# Patient Record
Sex: MALE | Race: WHITE | NOT HISPANIC OR LATINO | Employment: OTHER | URBAN - METROPOLITAN AREA
[De-identification: names, ages, dates, MRNs, and addresses within clinical notes are randomized per-mention and may not be internally consistent; named-entity substitution may affect disease eponyms.]

---

## 2017-01-22 ENCOUNTER — PATIENT OUTREACH (OUTPATIENT)
Dept: HEALTH INFORMATION MANAGEMENT | Facility: OTHER | Age: 72
End: 2017-01-22

## 2017-01-22 NOTE — PROGRESS NOTES
01/22/17 1ST ATTEMPT AWV-LEFT     Health Maintenance Due   Topic Date Due   • IMM DTaP/Tdap/Td Vaccine (1 - Tdap) 02/28/1964   • RETINAL SCREENING  03/10/2016

## 2017-02-08 NOTE — PROGRESS NOTES
2/8/17 -  Attempt #:2, PT SCHEDULED AWV SCP AND TDAP VACCINE.  COMPLETED NEW MEMBER.     Care Gap Scheduling (Attempt to Schedule EACH Overdue Care Gap!)     Health Maintenance Due   Topic Date Due   • IMM DTaP/Tdap/Td Vaccine (1 - Tdap) Scheduled   • RETINAL SCREENING  Will schedule with his own Ophthalmologist.           [employer] Activation:Active

## 2017-02-09 ENCOUNTER — TELEPHONE (OUTPATIENT)
Dept: MEDICAL GROUP | Age: 72
End: 2017-02-09

## 2017-02-09 NOTE — TELEPHONE ENCOUNTER
ANNUAL WELLNESS VISIT PRE-VISIT PLANNING     1.  Reviewed last PCP office visit assessment and plan notes: Yes    2.  If any orders were placed last visit do we have Results/Consult Notes?        •  Labs? Yes order for 5/22/17       •  Imaging? No        •  Referrals? No     3.  Patient Care Coordination Note was updated with diagnosis information:  Yes    4.  Patient is due for these Health Maintenance Topics:   Health Maintenance Due   Topic Date Due   • IMM DTaP/Tdap/Td Vaccine (1 - Tdap) 02/28/1964   • RETINAL SCREENING  03/10/2016   • Annual Wellness Visit  02/09/2017              5.  Immunizations were updated in [employer] using WebIZ?: Yes       •  Web Iz Recommendations:  Tdap, Hep A, Hep B,        •  Is patient due for Tdap/Shingles? Yes.  If yes, was patient alerted of copay? Yes    6.  Patient has:       •   Diabetes: Type 2 DM       •   COPD: no       •   CHF: no       •   Depression: no    7.  Updated Care Team with [employer] and all specialists?        •   Gait devices, O2, CPAP, etc: yes        •   Eye professional: yes       •   Other specialists (GYN, cardiology, endo, etc): yes    8.  Is patient in need of any refills prior to office visit? No       •    Separate refill encounter created?: no    9.  Patient was informed to arrive 15 min prior to their scheduled appointment and bring in their medication bottles? yes    10.  Patient was advised: “This is a free wellness visit. The provider will screen for medical conditions to help you stay healthy. If you have other concerns to address you may be asked to discuss these at a separate visit or there may be an additional fee.”  Yes

## 2017-02-14 ENCOUNTER — OFFICE VISIT (OUTPATIENT)
Dept: MEDICAL GROUP | Age: 72
End: 2017-02-14
Payer: MEDICARE

## 2017-02-14 VITALS
TEMPERATURE: 97.7 F | OXYGEN SATURATION: 96 % | BODY MASS INDEX: 29.26 KG/M2 | HEIGHT: 71 IN | WEIGHT: 209 LBS | SYSTOLIC BLOOD PRESSURE: 108 MMHG | DIASTOLIC BLOOD PRESSURE: 78 MMHG | HEART RATE: 92 BPM

## 2017-02-14 DIAGNOSIS — N18.30 CKD (CHRONIC KIDNEY DISEASE) STAGE 3, GFR 30-59 ML/MIN (HCC): ICD-10-CM

## 2017-02-14 DIAGNOSIS — H35.049 RETINAL HEMORRHAGE DUE TO SECONDARY DIABETES (HCC): ICD-10-CM

## 2017-02-14 DIAGNOSIS — E11.8 CONTROLLED TYPE 2 DIABETES MELLITUS WITH COMPLICATION, WITHOUT LONG-TERM CURRENT USE OF INSULIN (HCC): ICD-10-CM

## 2017-02-14 DIAGNOSIS — R60.0 BILATERAL EDEMA OF LOWER EXTREMITY: ICD-10-CM

## 2017-02-14 DIAGNOSIS — N52.8 OTHER MALE ERECTILE DYSFUNCTION: ICD-10-CM

## 2017-02-14 DIAGNOSIS — Z00.00 MEDICARE ANNUAL WELLNESS VISIT, SUBSEQUENT: ICD-10-CM

## 2017-02-14 DIAGNOSIS — K21.00 GASTROESOPHAGEAL REFLUX DISEASE WITH ESOPHAGITIS: ICD-10-CM

## 2017-02-14 DIAGNOSIS — I10 ESSENTIAL HYPERTENSION: ICD-10-CM

## 2017-02-14 DIAGNOSIS — E78.2 MIXED HYPERLIPIDEMIA: ICD-10-CM

## 2017-02-14 DIAGNOSIS — E11.36 CATARACT, DIABETIC (HCC): ICD-10-CM

## 2017-02-14 DIAGNOSIS — E13.319 RETINAL HEMORRHAGE DUE TO SECONDARY DIABETES (HCC): ICD-10-CM

## 2017-02-14 DIAGNOSIS — M1A.09X0 IDIOPATHIC CHRONIC GOUT OF MULTIPLE SITES WITHOUT TOPHUS: ICD-10-CM

## 2017-02-14 PROCEDURE — G0439 PPPS, SUBSEQ VISIT: HCPCS | Performed by: INTERNAL MEDICINE

## 2017-02-14 PROCEDURE — 1036F TOBACCO NON-USER: CPT | Performed by: INTERNAL MEDICINE

## 2017-02-14 PROCEDURE — G8510 SCR DEP NEG, NO PLAN REQD: HCPCS | Performed by: INTERNAL MEDICINE

## 2017-02-14 PROCEDURE — 3044F HG A1C LEVEL LT 7.0%: CPT | Performed by: INTERNAL MEDICINE

## 2017-02-14 ASSESSMENT — PAIN SCALES - GENERAL: PAINLEVEL: NO PAIN

## 2017-02-14 ASSESSMENT — PATIENT HEALTH QUESTIONNAIRE - PHQ9: CLINICAL INTERPRETATION OF PHQ2 SCORE: 0

## 2017-02-14 NOTE — MR AVS SNAPSHOT
"        Corbin Rodríguez   2017 11:00 AM   Office Visit   MRN: [id number]    Department:  [address] [place] [address]   Dept Phone:  [phone]    Description:  Male : 1945   Provider:  Juliocesar Piedra M.D.; [facility]            Reason for Visit     Annual Wellness Visit           Allergies as of 2017     No Known Allergies      You were diagnosed with     Medicare annual wellness visit, subsequent   [121688]       Cataract, diabetic (CMS-HCC)   [133703]       Mixed hyperlipidemia   [272.2.ICD-9-CM]       Essential hypertension   [2217984]       Other male erectile dysfunction   [3571002]       Controlled type 2 diabetes mellitus with complication, without long-term current use of insulin (CMS-HCC)   [0950817]       Gastroesophageal reflux disease with esophagitis   [7728286]       CKD (chronic kidney disease) stage 3, GFR 30-59 ml/min   [777926]       Idiopathic chronic gout of multiple sites without tophus   [941026]       Retinal hemorrhage due to secondary diabetes (CMS-HCC)   [783642]       Bilateral edema of lower extremity   [623930]         Vital Signs     Blood Pressure Pulse Temperature Height Weight Body Mass Index    108/78 mmHg 92 36.5 °C (97.7 °F) 1.803 m (5' 11\") 94.802 kg (209 lb) 29.16 kg/m2    Oxygen Saturation Smoking Status                96% Former Smoker          Basic Information     Date Of Birth Sex Race Ethnicity Preferred Language    1945 Male White Non- English      Your appointments     May 22, 2017  3:20 PM   Diabetes Care Visit with Juliocesar Piedra M.D., [facility] [place] [facility] RN   [address] [id number] [phone]           You will be receiving a confirmation call a few days before your appointment from our automated call confirmation system.              Problem List              ICD-10-CM Priority Class Noted - Resolved    Mixed hyperlipidemia E78.2   2012 - Present    Essential " hypertension I10   4/4/2012 - Present    ED (erectile dysfunction) N52.9   4/4/2012 - Present    Controlled type 2 diabetes mellitus with complication, without long-term current use of insulin (CMS-HCC) E11.8   12/16/2013 - Present    Gastroesophageal reflux disease with esophagitis K21.0   9/17/2014 - Present    CKD (chronic kidney disease) stage 3, GFR 30-59 ml/min N18.3   3/9/2015 - Present    Idiopathic chronic gout of multiple sites without tophus M1A.09X0   12/4/2015 - Present    Retinal hemorrhage due to secondary diabetes- dr silverman in [place] , 2009- resolved E13.319   2/9/2016 - Present    Bilateral edema of lower extremity R60.0   12/19/2016 - Present    Cataract, diabetic (CMS-HCC) E11.36   2/14/2017 - Present      Health Maintenance        Date Due Completion Dates    RETINAL SCREENING 3/10/2016 3/10/2015    A1C SCREENING 6/15/2017 12/15/2016, 6/16/2016, 11/30/2015, 8/27/2015, 6/3/2015, 4/11/2014, 12/12/2013, 9/5/2013    DIABETES MONOFILAMENT / LE EXAM 6/20/2017 6/20/2016, 2/9/2016, 2/10/2014 (N/S), 9/13/2013 (N/S)    Override on 2/10/2014: (N/S)    Override on 9/13/2013: (N/S)    FASTING LIPID PROFILE 12/15/2017 12/15/2016, 6/16/2016, 11/30/2015, 8/27/2015, 6/3/2015, 2/27/2015, 9/8/2014, 4/11/2014, 2/10/2014, 12/12/2013, 9/5/2013    URINE ACR / MICROALBUMIN 12/15/2017 12/15/2016, 6/16/2016, 4/11/2014, 12/12/2013, 9/5/2013    SERUM CREATININE 12/15/2017 12/15/2016, 6/16/2016, 11/30/2015, 9/29/2015, 8/27/2015, 6/3/2015, 2/27/2015, 9/8/2014, 4/11/2014, 2/10/2014, 12/12/2013, 9/5/2013    COLONOSCOPY 8/31/2018 8/31/2015    IMM DTaP/Tdap/Td Vaccine (2 - Td) 1/30/2027 1/30/2017            Current Immunizations     13-VALENT PCV PREVNAR 12/4/2015    Influenza TIV (IM) 9/1/2015, 10/1/2013    Influenza Vaccine Adult HD 12/19/2016, 9/27/2014    Pneumococcal polysaccharide vaccine (PPSV-23) 7/5/2012    SHINGLES VACCINE 5/5/2010    Tdap Vaccine 1/30/2017      Below and/or attached are the medications your provider  expects you to take. Review all of your home medications and newly ordered medications with your provider and/or pharmacist. Follow medication instructions as directed by your provider and/or pharmacist. Please keep your medication list with you and share with your provider. Update the information when medications are discontinued, doses are changed, or new medications (including over-the-counter products) are added; and carry medication information at all times in the event of emergency situations     Allergies:  No Known Allergies          Medications  Valid as of: February 14, 2017 - 11:38 AM    Generic Name Brand Name Tablet Size Instructions for use    AmLODIPine Besylate (Tab) NORVASC 5 MG Take 1 Tab by mouth every day.        Aspirin (Tablet Delayed Response) ECOTRIN 81 MG Take 81 mg by mouth every day.        Atorvastatin Calcium (Tab) LIPITOR 80 MG Take 1 Tab by mouth every evening.        Cholecalciferol (Cap) Vitamin D 1000 UNIT Take 1 Cap by mouth every day.        Cyanocobalamin (Tab) VITAMIN B12 1000 MCG Take 1 Tab by mouth every day.        Furosemide (Tab) LASIX 40 MG Take 1 Tab by mouth 1 time daily as needed. For edema        Indomethacin (Cap) INDOCIN 50 MG Take 1 Cap by mouth 3 times a day as needed. For acute gout attack        Lisinopril (Tab) PRINIVIL 20 MG Take 1 Tab by mouth every day.        MetFORMIN HCl (Tab) GLUCOPHAGE 500 MG Take 1 Tab by mouth every day.        Omeprazole (CAPSULE DELAYED RELEASE) PRILOSEC 20 MG Take 1 Cap by mouth every day.        Potassium   Take  by mouth.        .                 Medicines prescribed today were sent to:     [address] ([address]), [place] - [address] [place] [address]    [address] [place] [address] (N) [place] [address]    Phone: [phone] Fax: [phone]    Open 24 Hours?: No      Medication refill instructions:       If your prescription bottle indicates you have medication refills left, it is not necessary to call your provider’s office.  Please contact your pharmacy and they will refill your medication.    If your prescription bottle indicates you do not have any refills left, you may request refills at any time through one of the following ways: The online Twibingo system (except Urgent Care), by calling your provider’s office, or by asking your pharmacy to contact your provider’s office with a refill request. Medication refills are processed only during regular business hours and may not be available until the next business day. Your provider may request additional information or to have a follow-up visit with you prior to refilling your medication.   *Please Note: Medication refills are assigned a new Rx number when refilled electronically. Your pharmacy may indicate that no refills were authorized even though a new prescription for the same medication is available at the pharmacy. Please request the medicine by name with the pharmacy before contacting your provider for a refill.           Twibingo Access Code: Activation code not generated  Current Twibingo Status: Active

## 2017-02-14 NOTE — PROGRESS NOTES
Chief Complaint   Patient presents with   • Annual Wellness Visit         HPI:  Corbin is a 71 y.o. male here for Medicare Annual Wellness Visit         Patient Active Problem List    Diagnosis Date Noted   • Bilateral edema of lower extremity 12/19/2016   • Retinal hemorrhage due to secondary diabetes- dr silverman in [place] , 2009- resolved 02/09/2016   • Idiopathic chronic gout of multiple sites without tophus 12/04/2015   • Colon polyposis- removed 8/2015 09/01/2015   • CKD (chronic kidney disease) stage 3, GFR 30-59 ml/min 03/09/2015   • Gastroesophageal reflux disease with esophagitis 09/17/2014   • Diabetes mellitus type 2, controlled, with complications (CMS-[facility]) 12/16/2013   • HLD (hyperlipidemia) 04/04/2012   • Essential hypertension 04/04/2012   • ED (erectile dysfunction) 04/04/2012       Current Outpatient Prescriptions   Medication Sig Dispense Refill   • metformin (GLUCOPHAGE) 500 MG Tab Take 1 Tab by mouth every day. 90 Tab 4   • atorvastatin (LIPITOR) 80 MG tablet Take 1 Tab by mouth every evening. 90 Tab 4   • amlodipine (NORVASC) 5 MG Tab Take 1 Tab by mouth every day. 90 Tab 4   • furosemide (LASIX) 40 MG Tab Take 1 Tab by mouth 1 time daily as needed. For edema 90 Tab 4   • lisinopril (PRINIVIL) 20 MG Tab Take 1 Tab by mouth every day. 90 Tab 4   • omeprazole (PRILOSEC) 20 MG delayed-release capsule Take 1 Cap by mouth every day. 90 Cap 4   • Potassium (POTASSIMIN PO) Take  by mouth.     • indomethacin (INDOCIN) 50 MG Cap Take 1 Cap by mouth 3 times a day as needed. For acute gout attack 30 Cap 0   • Cholecalciferol (VITAMIN D) 1000 UNIT CAPS Take 1 Cap by mouth every day. 100 Cap 11   • cyanocobalamin (VITAMIN B12) 1000 MCG TABS Take 1 Tab by mouth every day. 100 Each 11   • aspirin EC (ECOTRIN) 81 MG TBEC Take 81 mg by mouth every day.       No current facility-administered medications for this visit.        The patient reports adherence to this regimen    Current supplements as per medication  list.   Chronic narcotic pain medicines: no    Allergies: Review of patient's allergies indicates no known allergies.    Current social contact/activities: reading books, goes to movies, goes dinners       Is patient current with immunizations?  yes        He  reports that he quit smoking about 36 years ago. His smoking use included Cigarettes. He has a 28 pack-year smoking history. He has never used smokeless tobacco. He reports that he drinks about 0.5 - 3.5 oz of alcohol per week. He reports that he does not use illicit drugs.  Counseling given: Yes        DPA/Advanced Directive:  Patient does not have an advanced directive.  If not on file, instructed to bring in a copy to scan into his chart. If no advanced directive exists, a packet and workshop information was provided    ROS:    Gait: Uses no assistive device    Ostomy: no    Other tubes: no    Amputations: no    Chronic oxygen use no    Last eye exam 2016    : Denies incontinence.         Screening:    DIABETES  1. Records requested for overdue  topics specifically for diabetes? yes      a. If yes, specifically requested:Retinal screening has appointment on 2/16/17      2. Has patient ever had diabetes education? Yes      a. If so, when?2010        b. If not, is patient interested? no         Depression Screening    Little interest or pleasure in doing things?  0 - not at all  Feeling down, depressed, or hopeless?  0 - not at all  Patient Health Questionnaire Score: 0    If depressive symptoms identified deferred to follow up visit unless specifically addressed in assessment and plan.    Screening for Cognitive Impairment    Three Minute Recall (banana, sunrise, fence)  3/3 Apple, mitchel, table  Draw clock face with all 12 numbers set to the hand to show 10 minutes past 11 o'clock  1 5/5  Cognitive concerns identified deferred for follow up unless specifically addressed in assessment and plan.    Fall Risk Assessment    Has the patient had two or more  falls in the last year or any fall with injury in the last year?  No    Safety Assessment    Throw rugs on floor.  Yes  Handrails on all stairs.  Yes  Good lighting in all hallways.  Yes  Difficulty hearing.  Yes  Patient counseled about all safety risks that were identified.    Functional Assessment ADLs    Are there any barriers preventing you from cooking for yourself or meeting nutritional needs?  No.    Are there any barriers preventing you from driving safely or obtaining transportation?  Yes. Night vision change due to cateracts.  Are there any barriers preventing you from using a telephone or calling for help?  No.    Are there any barriers preventing you from shopping?  No.    Are there any barriers preventing you from taking care of your own finances?  No.    Are there any barriers preventing you from managing your medications?  No.    Are currently engaging any exercise or physical activity?  Yes.  Walks on treadmill 30 minutes every day.    Health Maintenance Summary                RETINAL SCREENING Overdue 3/10/2016      Done 3/10/2015 AMB REFERRAL FOR RETINAL SCREENING EXAM    Annual Wellness Visit Overdue 2/9/2017      Done 2/9/2016 Visit Dx: Medicare annual wellness visit, subsequent    A1C SCREENING Next Due 6/15/2017      Done 12/15/2016 HEMOGLOBIN A1C (A)     Patient has more history with this topic...    DIABETES MONOFILAMENT / LE EXAM Next Due 6/20/2017      Done 6/20/2016 AMB DIABETIC MONOFILAMENT LOWER EXTREMITY EXAM     Patient has more history with this topic...    FASTING LIPID PROFILE Next Due 12/15/2017      Done 12/15/2016 LIPID PROFILE     Patient has more history with this topic...    URINE ACR / MICROALBUMIN Next Due 12/15/2017      Done 12/15/2016 MICROALBUMIN CREAT RATIO URINE     Patient has more history with this topic...    SERUM CREATININE Next Due 12/15/2017      Done 12/15/2016 COMP METABOLIC PANEL (A)     Patient has more history with this topic...    COLONOSCOPY Next Due  "8/31/2018      Done 8/31/2015 AMB REFERRAL TO GI FOR COLONOSCOPY    IMM DTaP/Tdap/Td Vaccine Next Due 1/30/2027      Done 1/30/2017 Imm Admin: Tdap Vaccine          Patient Care Team:  Juliocesar Piedra M.D. as PCP - General (Internal Medicine)  Juliocesar Leggett M.D. as Consulting Physician (Gastroenterology)  Meghan Marroquin M.D. as Consulting Physician (Ophthalmology)  [facility] as Consulting Physician (Optometry)    Social History   Substance Use Topics   • Smoking status: Former Smoker -- 2.00 packs/day for 14 years     Types: Cigarettes     Quit date: 12/31/1980   • Smokeless tobacco: Never Used      Comment: stopped 1980   • Alcohol Use: 0.5 - 3.5 oz/week     1-7 Glasses of wine per week     Family History   Problem Relation Age of Onset   • Lung Disease Mother      emphazmia   • Alcohol/Drug Father    • Other Maternal Grandfather      Parkinson's   • Other Brother      Heart valve surgery/ heart valve defect from birth.     He  has a past medical history of Hypertension; Hyperlipidemia; Osteoarthritis; Impaired fasting glucose; and DJD (degenerative joint disease).   Past Surgical History   Procedure Laterality Date   • Polpectomy, large intestine     • Pr inj dx/ther agnt paravert facet joint, yoli*  5/22/2014     Performed by Maurizio Coleman M.D. at [facility] ORS   • Pr inj dx/ther agnt paravert facet joint, ce*  5/22/2014     Performed by Maurizio Coleman M.D. at [facility] OR[facility]   • Pr inj dx/ther agnt paravert facet joint, yoli*  5/22/2014     Performed by Maurizio Coleman M.D. at [facility] ORS           Exam:     Blood pressure 108/78, pulse 92, temperature 36.5 °C (97.7 °F), height 1.803 m (5' 11\"), weight 94.802 kg (209 lb), SpO2 96 %. Body mass index is 29.16 kg/(m^2).    Hearing excellent.    Dentition good  Alert, oriented in no acute distress.  Eye contact is good, speech goal directed, affect calm        Assessment and Plan. The following treatment and monitoring " plan is recommended:         1. Medicare annual wellness visit, subsequent     2. Cataract, diabetic (CMS-HCC) - surgery tbd later this yr Annual Wellness Visit - Includes PPPS Subsequent ()   3. Mixed hyperlipidemia -Under good control. Continue same regimen.   Annual Wellness Visit - Includes PPPS Subsequent ()   4. Essential hypertension -Under good control. Continue same regimen. Annual Wellness Visit - Includes PPPS Subsequent ()   5. Other male erectile dysfunction -Under good control. Continue same regimen. Annual Wellness Visit - Includes PPPS Subsequent ()   6. Controlled type 2 diabetes mellitus with complication, without long-term current use of insulin (CMS-HCC) -Under good control. Continue same regimen. Annual Wellness Visit - Includes PPPS Subsequent ()   7. Gastroesophageal reflux disease with esophagitis -Under good control. Continue same regimen. Annual Wellness Visit - Includes PPPS Subsequent ()   8. CKD (chronic kidney disease) stage 3, GFR 30-59 ml/min -Under good control. Continue same regimen. Annual Wellness Visit - Includes PPPS Subsequent ()   9. Idiopathic chronic gout of multiple sites without tophus -Under good control. Continue same regimen. Annual Wellness Visit - Includes PPPS Subsequent ()   10. Retinal hemorrhage due to secondary diabetes- dr silverman in [place] , 2009- resolved-Under good control. Continue same regimen.  Annual Wellness Visit - Includes PPPS Subsequent ()   11. Bilateral edema of lower extremity -Under good control. Continue same regimen. Annual Wellness Visit - Includes PPPS Subsequent ()         Services needed: No services needed at this time  Health Care Screening recommendations as per orders if indicated.  Referrals offered: PT/OT/Nutrition counseling/Behavioral Health/Smoking cessation as per orders if indicated.    Discussion today about general wellness and lifestyle habits:    · Prevent falls and reduce  trip hazards; Cautioned about securing or removing rugs.  · Have a working fire alarm and carbon monoxide detector;   · Engage in regular physical activity and social activities       Follow-up: No Follow-up on file.

## 2017-04-26 ENCOUNTER — PATIENT OUTREACH (OUTPATIENT)
Dept: HEALTH INFORMATION MANAGEMENT | Facility: OTHER | Age: 72
End: 2017-04-26

## 2017-04-26 NOTE — PROGRESS NOTES
Outbound call to patient for medication reconciliation.    Updated allergy and medication lists.    Patient demonstrates adherence to medication schedule and understanding of indications for medications.    Meds that meet Beer's criteria for potentially inappropriate use in patients over 65 include: indomethacin. Patient only takes as needed for gout and he has only had 2 gout flare ups ever.  Indomethacin interacts with other medications such as lisinopril, aspirin, and furosemide, however this is not a concern due to how infrequently the patient uses this medication.      Patient denies any side effects from medications.  Had GI side effects from metformin but have subsided since first week of therapy.    Has appropriate medication regimen for current disease states. His FBS today was 93. GFR 50. Doses of medications are appropriate.    Patient feels satisfied with medication regimen.      Patient can afford medications.    Educated patient on the importance of adherence.    Patient is up to date with vaccinations.

## 2017-07-06 ENCOUNTER — HOSPITAL ENCOUNTER (OUTPATIENT)
Dept: LAB | Facility: MEDICAL CENTER | Age: 72
End: 2017-07-06
Attending: INTERNAL MEDICINE
Payer: MEDICARE

## 2017-07-06 DIAGNOSIS — E78.5 HYPERLIPIDEMIA, UNSPECIFIED HYPERLIPIDEMIA TYPE: ICD-10-CM

## 2017-07-06 DIAGNOSIS — E11.8 CONTROLLED TYPE 2 DIABETES MELLITUS WITH COMPLICATION, WITHOUT LONG-TERM CURRENT USE OF INSULIN (HCC): ICD-10-CM

## 2017-07-06 LAB
ALBUMIN SERPL BCP-MCNC: 4 G/DL (ref 3.2–4.9)
ALBUMIN/GLOB SERPL: 1.6 G/DL
ALP SERPL-CCNC: 55 U/L (ref 30–99)
ALT SERPL-CCNC: 70 U/L (ref 2–50)
ANION GAP SERPL CALC-SCNC: 13 MMOL/L (ref 0–11.9)
AST SERPL-CCNC: 61 U/L (ref 12–45)
BILIRUB SERPL-MCNC: 0.6 MG/DL (ref 0.1–1.5)
BUN SERPL-MCNC: 10 MG/DL (ref 8–22)
CALCIUM SERPL-MCNC: 8.8 MG/DL (ref 8.5–10.5)
CHLORIDE SERPL-SCNC: 101 MMOL/L (ref 96–112)
CHOLEST SERPL-MCNC: 111 MG/DL (ref 100–199)
CO2 SERPL-SCNC: 24 MMOL/L (ref 20–33)
CREAT SERPL-MCNC: 1.27 MG/DL (ref 0.5–1.4)
CREAT UR-MCNC: 234.4 MG/DL
EST. AVERAGE GLUCOSE BLD GHB EST-MCNC: 123 MG/DL
GFR SERPL CREATININE-BSD FRML MDRD: 55 ML/MIN/1.73 M 2
GLOBULIN SER CALC-MCNC: 2.5 G/DL (ref 1.9–3.5)
GLUCOSE SERPL-MCNC: 85 MG/DL (ref 65–99)
HBA1C MFR BLD: 5.9 % (ref 0–5.6)
HDLC SERPL-MCNC: 67 MG/DL
LDLC SERPL CALC-MCNC: 31 MG/DL
MICROALBUMIN UR-MCNC: 0.7 MG/DL
MICROALBUMIN/CREAT UR: 3 MG/G (ref 0–30)
POTASSIUM SERPL-SCNC: 4.3 MMOL/L (ref 3.6–5.5)
PROT SERPL-MCNC: 6.5 G/DL (ref 6–8.2)
SODIUM SERPL-SCNC: 138 MMOL/L (ref 135–145)
TRIGL SERPL-MCNC: 63 MG/DL (ref 0–149)

## 2017-07-06 PROCEDURE — 83036 HEMOGLOBIN GLYCOSYLATED A1C: CPT

## 2017-07-06 PROCEDURE — 82570 ASSAY OF URINE CREATININE: CPT

## 2017-07-06 PROCEDURE — 82043 UR ALBUMIN QUANTITATIVE: CPT

## 2017-07-06 PROCEDURE — 36415 COLL VENOUS BLD VENIPUNCTURE: CPT

## 2017-07-06 PROCEDURE — 80053 COMPREHEN METABOLIC PANEL: CPT

## 2017-07-06 PROCEDURE — 80061 LIPID PANEL: CPT

## 2017-07-07 ENCOUNTER — TELEPHONE (OUTPATIENT)
Dept: MEDICAL GROUP | Age: 72
End: 2017-07-07

## 2017-07-07 NOTE — TELEPHONE ENCOUNTER
ESTABLISHED PATIENT PRE-VISIT PLANNING     Note: Patient will not be contacted if there is no indication to call.     1.  Reviewed notes from the last few office visits within the medical group: Yes    2.  If any orders were placed at last visit or intended to be done for this visit (i.e. 6 mos follow-up), do we have Results/Consult Notes?        •  Labs - Labs were not ordered at last office visit.       •  Imaging - Imaging was not ordered at last office visit.       •  Referrals - No referrals were ordered at last office visit.    3. Is this appointment scheduled as a Hospital Follow-Up? No    4.  Immunizations were updated in [employer] using Web[employer]?: Yes       •  Web Iz Recommendations: FLU HEPATITIS A  TD    5.  Patient is due for the following Health Maintenance Topics:   Health Maintenance Due   Topic Date Due   • RETINAL SCREENING  03/10/2016   • DIABETES MONOFILAMENT / LE EXAM  06/20/2017       - Patient has completed Patient is up to date on all vaccines Immunization(s) per WebIZ. Chart has been updated.    6.  Patient was NOT informed to arrive 15 min prior to their scheduled appointment and bring in their medication bottles.

## 2017-07-10 ENCOUNTER — OFFICE VISIT (OUTPATIENT)
Dept: MEDICAL GROUP | Age: 72
End: 2017-07-10
Payer: MEDICARE

## 2017-07-10 VITALS
SYSTOLIC BLOOD PRESSURE: 90 MMHG | DIASTOLIC BLOOD PRESSURE: 50 MMHG | BODY MASS INDEX: 29.23 KG/M2 | OXYGEN SATURATION: 94 % | HEIGHT: 71 IN | HEART RATE: 105 BPM | TEMPERATURE: 97.7 F | WEIGHT: 208.8 LBS

## 2017-07-10 DIAGNOSIS — N18.30 CKD (CHRONIC KIDNEY DISEASE) STAGE 3, GFR 30-59 ML/MIN (HCC): ICD-10-CM

## 2017-07-10 DIAGNOSIS — E11.8 CONTROLLED TYPE 2 DIABETES MELLITUS WITH COMPLICATION, WITHOUT LONG-TERM CURRENT USE OF INSULIN (HCC): ICD-10-CM

## 2017-07-10 DIAGNOSIS — E78.2 MIXED HYPERLIPIDEMIA: ICD-10-CM

## 2017-07-10 DIAGNOSIS — R21 RASH OF ENTIRE BODY: ICD-10-CM

## 2017-07-10 DIAGNOSIS — N52.01 ERECTILE DYSFUNCTION DUE TO ARTERIAL INSUFFICIENCY: ICD-10-CM

## 2017-07-10 DIAGNOSIS — M1A.09X0 IDIOPATHIC CHRONIC GOUT OF MULTIPLE SITES WITHOUT TOPHUS: ICD-10-CM

## 2017-07-10 DIAGNOSIS — K21.00 GASTROESOPHAGEAL REFLUX DISEASE WITH ESOPHAGITIS: ICD-10-CM

## 2017-07-10 DIAGNOSIS — I10 ESSENTIAL HYPERTENSION: ICD-10-CM

## 2017-07-10 PROCEDURE — 99215 OFFICE O/P EST HI 40 MIN: CPT | Performed by: INTERNAL MEDICINE

## 2017-07-10 RX ORDER — ATORVASTATIN CALCIUM 80 MG/1
80 TABLET, FILM COATED ORAL EVERY EVENING
Qty: 90 TAB | Refills: 4 | Status: SHIPPED | OUTPATIENT
Start: 2017-07-10 | End: 2018-01-15 | Stop reason: SDUPTHER

## 2017-07-10 RX ORDER — AMLODIPINE BESYLATE 5 MG/1
5 TABLET ORAL DAILY
Qty: 90 TAB | Refills: 4 | Status: SHIPPED | OUTPATIENT
Start: 2017-07-10 | End: 2018-01-15 | Stop reason: SDUPTHER

## 2017-07-10 RX ORDER — OMEPRAZOLE 20 MG/1
20 CAPSULE, DELAYED RELEASE ORAL DAILY
Qty: 90 CAP | Refills: 4 | Status: SHIPPED | OUTPATIENT
Start: 2017-07-10 | End: 2018-01-15 | Stop reason: SDUPTHER

## 2017-07-10 RX ORDER — LISINOPRIL 20 MG/1
20 TABLET ORAL DAILY
Qty: 90 TAB | Refills: 4 | Status: SHIPPED | OUTPATIENT
Start: 2017-07-10 | End: 2018-01-15 | Stop reason: SDUPTHER

## 2017-07-10 ASSESSMENT — ENCOUNTER SYMPTOMS
NEUROLOGICAL NEGATIVE: 1
EYES NEGATIVE: 1
MUSCULOSKELETAL NEGATIVE: 1
RESPIRATORY NEGATIVE: 1
CONSTITUTIONAL NEGATIVE: 1
PSYCHIATRIC NEGATIVE: 1
GASTROINTESTINAL NEGATIVE: 1
HYPERTENSION: 1
CARDIOVASCULAR NEGATIVE: 1

## 2017-07-10 NOTE — MR AVS SNAPSHOT
"        Corbin Rodríguez   7/10/2017 2:20 PM   Office Visit   MRN: [id number]    Department:  [address] [place] [address]   Dept Phone:  [phone]    Description:  Male : 1945   Provider:  Juliocesar Piedra M.D.; NARVAEZ DIABETES RN           Reason for Visit     Diabetes Mellitus     Hypertension     Hyperlipidemia           Allergies as of 7/10/2017     No Known Allergies      You were diagnosed with     Controlled type 2 diabetes mellitus with complication, without long-term current use of insulin (CMS-HCC)   [8798293]       Idiopathic chronic gout of multiple sites without tophus   [169823]       Mixed hyperlipidemia   [272.2.ICD-9-CM]       Essential hypertension   [3356712]       CKD (chronic kidney disease) stage 3, GFR 30-59 ml/min   [096082]       Gastroesophageal reflux disease with esophagitis   [1947282]       Erectile dysfunction due to arterial insufficiency   [377324]       Rash of entire body   [096009]         Vital Signs     Blood Pressure Pulse Temperature Height Weight Body Mass Index    90/50 mmHg 105 36.5 °C (97.7 °F) 1.803 m (5' 11\") 94.711 kg (208 lb 12.8 oz) 29.13 kg/m2    Oxygen Saturation Smoking Status                94% Former Smoker          Basic Information     Date Of Birth Sex Race Ethnicity Preferred Language    1945 Male White Non- English      Problem List              ICD-10-CM Priority Class Noted - Resolved    Mixed hyperlipidemia E78.2   2012 - Present    Essential hypertension I10   2012 - Present    ED (erectile dysfunction) N52.9   2012 - Present    Controlled type 2 diabetes mellitus with complication, without long-term current use of insulin (CMS-HCC) E11.8   2013 - Present    Gastroesophageal reflux disease with esophagitis K21.0   2014 - Present    CKD (chronic kidney disease) stage 3, GFR 30-59 ml/min N18.3   3/9/2015 - Present    Idiopathic chronic gout of multiple sites without tophus M1A.09X0   2015 - Present    Retinal " hemorrhage due to secondary diabetes- dr silverman in [place] , 2009- resolved E13.319   2/9/2016 - Present    Bilateral edema of lower extremity R60.0   12/19/2016 - Present    Cataract, diabetic (CMS-HCC) E11.36   2/14/2017 - Present      Health Maintenance        Date Due Completion Dates    RETINAL SCREENING 3/10/2016 3/10/2015    DIABETES MONOFILAMENT / LE EXAM 6/20/2017 6/20/2016, 2/9/2016, 2/10/2014 (N/S), 9/13/2013 (N/S)    Override on 2/10/2014: (N/S)    Override on 9/13/2013: (N/S)    IMM INFLUENZA (1) 9/1/2017 12/19/2016, 9/1/2015, 9/27/2014, 10/1/2013    A1C SCREENING 1/6/2018 7/6/2017, 12/15/2016, 6/16/2016, 11/30/2015, 8/27/2015, 6/3/2015, 4/11/2014, 12/12/2013, 9/5/2013    FASTING LIPID PROFILE 7/6/2018 7/6/2017, 12/15/2016, 6/16/2016, 11/30/2015, 8/27/2015, 6/3/2015, 2/27/2015, 9/8/2014, 4/11/2014, 2/10/2014, 12/12/2013, 9/5/2013    URINE ACR / MICROALBUMIN 7/6/2018 7/6/2017, 12/15/2016, 6/16/2016, 4/11/2014, 12/12/2013, 9/5/2013    SERUM CREATININE 7/6/2018 7/6/2017, 12/15/2016, 6/16/2016, 11/30/2015, 9/29/2015, 8/27/2015, 6/3/2015, 2/27/2015, 9/8/2014, 4/11/2014, 2/10/2014, 12/12/2013, 9/5/2013    COLONOSCOPY 8/31/2018 8/31/2015    IMM DTaP/Tdap/Td Vaccine (2 - Td) 1/30/2027 1/30/2017            Current Immunizations     13-VALENT PCV PREVNAR 12/4/2015    Influenza TIV (IM) 9/1/2015, 10/1/2013    Influenza Vaccine Adult HD 12/19/2016, 9/27/2014    Pneumococcal polysaccharide vaccine (PPSV-23) 7/5/2012    SHINGLES VACCINE 5/5/2010    Tdap Vaccine 1/30/2017      Below and/or attached are the medications your provider expects you to take. Review all of your home medications and newly ordered medications with your provider and/or pharmacist. Follow medication instructions as directed by your provider and/or pharmacist. Please keep your medication list with you and share with your provider. Update the information when medications are discontinued, doses are changed, or new medications (including  over-the-counter products) are added; and carry medication information at all times in the event of emergency situations     Allergies:  No Known Allergies          Medications  Valid as of: July 10, 2017 -  3:12 PM    Generic Name Brand Name Tablet Size Instructions for use    AmLODIPine Besylate (Tab) NORVASC 5 MG Take 1 Tab by mouth every day.        Aspirin (Tablet Delayed Response) ECOTRIN 81 MG Take 81 mg by mouth every day.        Atorvastatin Calcium (Tab) LIPITOR 80 MG Take 1 Tab by mouth every evening.        Cholecalciferol (Cap) Vitamin D 1000 UNIT Take 1 Cap by mouth every day.        Cyanocobalamin (Tab) VITAMIN B12 1000 MCG Take 1 Tab by mouth every day.        Lisinopril (Tab) PRINIVIL 20 MG Take 1 Tab by mouth every day.        MetFORMIN HCl (Tab) GLUCOPHAGE 500 MG Take 1 Tab by mouth every day.        Omeprazole (CAPSULE DELAYED RELEASE) PRILOSEC 20 MG Take 1 Cap by mouth every day.        Potassium   Take  by mouth.        .                 Medicines prescribed today were sent to:     [address]), [address]) [place] [address]    Phone: [phone] Fax: [phone]    Open 24 Hours?: No      Medication refill instructions:       If your prescription bottle indicates you have medication refills left, it is not necessary to call your provider’s office. Please contact your pharmacy and they will refill your medication.    If your prescription bottle indicates you do not have any refills left, you may request refills at any time through one of the following ways: The online Recommend system (except Urgent Care), by calling your provider’s office, or by asking your pharmacy to contact your provider’s office with a refill request. Medication refills are processed only during regular business hours and may not be available until the next business day. Your provider may request additional information or to have a follow-up visit with  you prior to refilling your medication.   *Please Note: Medication refills are assigned a new Rx number when refilled electronically. Your pharmacy may indicate that no refills were authorized even though a new prescription for the same medication is available at the pharmacy. Please request the medicine by name with the pharmacy before contacting your provider for a refill.        Your To Do List     Future Labs/Procedures Complete By Expires    COMP METABOLIC PANEL  10/8/2017 7/10/2018    HEMOGLOBIN A1C  10/8/2017 7/10/2018    LIPID PROFILE  10/8/2017 7/10/2018    MICROALBUMIN CREAT RATIO URINE  10/8/2017 7/10/2018      Referral     A referral request has been sent to our patient care coordination department. Please allow 3-5 business days for us to process this request and contact you either by phone or mail. If you do not hear from us by the 5th business day, please call us at [phone].           Promuc Access Code: Activation code not generated  Current Promuc Status: Active

## 2017-07-10 NOTE — Clinical Note
[facility]  Juliocesar Piedra M.D.  [address]  [place] [address]  Fax: [phone]   Authorization for Release/Disclosure of   Protected Health Information   Name: DEXTER HERNANDEZ : 1945 SSN: [id number]   Address: [address] [place] [address] Phone:    [phone] (home)    I authorize the entity listed below to release/disclose the PHI below to:   [facility]/Juliocesar Piedra M.D. and Juliocesar Piedra M.D.   Provider or Entity Name:    Dr. Marroquin   [facility]   Phone:      Fax:  [phone]     Reason for request: continuity of care   Information to be released:    [  ] LAST COLONOSCOPY,  including any PATH REPORT and follow-up  [  ] LAST FIT/COLOGUARD RESULT [  ] LAST DEXA  [  ] LAST MAMMOGRAM  [  ] LAST PAP  [  ] LAST LABS [xxx  ] RETINA EXAM REPORT  [  ] IMMUNIZATION RECORDS  [  ] Release all info      [  ] Check here and initial the line next to each item to release ALL health information INCLUDING  _____ Care and treatment for drug and / or alcohol abuse  _____ HIV testing, infection status, or AIDS  _____ Genetic Testing    DATES OF SERVICE OR TIME PERIOD TO BE DISCLOSED: _____________  I understand and acknowledge that:  * This Authorization may be revoked at any time by you in writing, except if your health information has already been used or disclosed.  * Your health information that will be used or disclosed as a result of you signing this authorization could be re-disclosed by the recipient. If this occurs, your re-disclosed health information may no longer be protected by State or Federal laws.  * You may refuse to sign this Authorization. Your refusal will not affect your ability to obtain treatment.  * This Authorization becomes effective upon signing and will  on (date) __________.      If no date is indicated, this Authorization will  one (1) year from the signature date.    Name: Dexter Hernandez    Signature:   Date:     7/10/2017          PLEASE FAX REQUESTED RECORDS BACK TO: [phone]

## 2017-07-10 NOTE — PROGRESS NOTES
Subjective:      Corbin Rodríguez is a 72 y.o. male who presents with Diabetes Mellitus; Hypertension; and Hyperlipidemia  The patient is here for followup of chronic medical problems listed below. The patient is compliant with medications and having no side effects from them. Denies chest pain, abdominal pain, dyspnea, myalgias, or cough.   Patient Active Problem List    Diagnosis Date Noted   • Cataract, diabetic (CMS-HCC) 02/14/2017   • Bilateral edema of lower extremity 12/19/2016   • Retinal hemorrhage due to secondary diabetes- dr silverman in [place] , 2009- resolved 02/09/2016   • Idiopathic chronic gout of multiple sites without tophus 12/04/2015   • CKD (chronic kidney disease) stage 3, GFR 30-59 ml/min 03/09/2015   • Gastroesophageal reflux disease with esophagitis 09/17/2014   • Controlled type 2 diabetes mellitus with complication, without long-term current use of insulin (CMS-HCC) 12/16/2013   • Mixed hyperlipidemia 04/04/2012   • Essential hypertension 04/04/2012   • ED (erectile dysfunction) 04/04/2012     No Known Allergies  Outpatient Prescriptions Prior to Visit   Medication Sig Dispense Refill   • Potassium (POTASSIMIN PO) Take  by mouth.     • Cholecalciferol (VITAMIN D) 1000 UNIT CAPS Take 1 Cap by mouth every day. 100 Cap 11   • cyanocobalamin (VITAMIN B12) 1000 MCG TABS Take 1 Tab by mouth every day. 100 Each 11   • aspirin EC (ECOTRIN) 81 MG TBEC Take 81 mg by mouth every day.     • metformin (GLUCOPHAGE) 500 MG Tab Take 1 Tab by mouth every day. 90 Tab 4   • atorvastatin (LIPITOR) 80 MG tablet Take 1 Tab by mouth every evening. 90 Tab 4   • amlodipine (NORVASC) 5 MG Tab Take 1 Tab by mouth every day. 90 Tab 4   • furosemide (LASIX) 40 MG Tab Take 1 Tab by mouth 1 time daily as needed. For edema 90 Tab 4   • lisinopril (PRINIVIL) 20 MG Tab Take 1 Tab by mouth every day. 90 Tab 4   • omeprazole (PRILOSEC) 20 MG delayed-release capsule Take 1 Cap by mouth every day. 90 Cap 4   • indomethacin  "(INDOCIN) 50 MG Cap Take 1 Cap by mouth 3 times a day as needed. For acute gout attack 30 Cap 0     No facility-administered medications prior to visit.               Diabetes Mellitus    Hypertension        Review of Systems   Constitutional: Negative.    HENT: Negative.    Eyes: Negative.    Respiratory: Negative.    Cardiovascular: Negative.    Gastrointestinal: Negative.    Genitourinary: Negative.    Musculoskeletal: Negative.    Skin: Negative.    Neurological: Negative.    Endo/Heme/Allergies: Negative.    Psychiatric/Behavioral: Negative.           Objective:     BP 90/50 mmHg  Pulse 105  Temp(Src) 36.5 °C (97.7 °F)  Ht 1.803 m (5' 11\")  Wt 94.711 kg (208 lb 12.8 oz)  BMI 29.13 kg/m2  SpO2 94%     Physical Exam   Constitutional: He is oriented to person, place, and time. He appears well-developed and well-nourished. No distress.   HENT:   Head: Normocephalic and atraumatic.   Right Ear: External ear normal.   Left Ear: External ear normal.   Mouth/Throat: Oropharynx is clear and moist. No oropharyngeal exudate.   Eyes: Conjunctivae and EOM are normal. Pupils are equal, round, and reactive to light. Right eye exhibits no discharge.   Neck: Normal range of motion. Neck supple. No JVD present. No tracheal deviation present. No thyromegaly present.   Cardiovascular: Normal rate, regular rhythm, normal heart sounds and intact distal pulses.  Exam reveals no gallop.    Pulmonary/Chest: Effort normal and breath sounds normal. No respiratory distress. He has no wheezes. He has no rales. He exhibits no tenderness.   Abdominal: Soft. Bowel sounds are normal. He exhibits no distension and no mass. There is no tenderness. There is no rebound and no guarding. No hernia.   Genitourinary: Guaiac negative stool. No penile tenderness.   Musculoskeletal: He exhibits no edema or tenderness.   Lymphadenopathy:     He has no cervical adenopathy.   Neurological: He is alert and oriented to person, place, and time. He has " normal reflexes. He displays normal reflexes. No cranial nerve deficit. He exhibits normal muscle tone. Coordination normal.   Skin: Skin is warm and dry. No rash noted. He is not diaphoretic. No erythema. No pallor.   Psychiatric: He has a normal mood and affect. His behavior is normal. Judgment and thought content normal.   Nursing note and vitals reviewed.    Hospital Outpatient Visit on 07/06/2017   Component Date Value   • Creatinine, Urine 07/06/2017 234.40    • Microalbumin, Urine Rand* 07/06/2017 0.7    • Micro Alb Creat Ratio 07/06/2017 3    • Cholesterol,Tot 07/06/2017 111    • Triglycerides 07/06/2017 63    • HDL 07/06/2017 67    • LDL 07/06/2017 31    • Sodium 07/06/2017 138    • Potassium 07/06/2017 4.3    • Chloride 07/06/2017 101    • Co2 07/06/2017 24    • Anion Gap 07/06/2017 13.0*   • Glucose 07/06/2017 85    • Bun 07/06/2017 10    • Creatinine 07/06/2017 1.27    • Calcium 07/06/2017 8.8    • AST(SGOT) 07/06/2017 61*   • ALT(SGPT) 07/06/2017 70*   • Alkaline Phosphatase 07/06/2017 55    • Total Bilirubin 07/06/2017 0.6    • Albumin 07/06/2017 4.0    • Total Protein 07/06/2017 6.5    • Globulin 07/06/2017 2.5    • A-G Ratio 07/06/2017 1.6    • Glycohemoglobin 07/06/2017 5.9*   • Est Avg Glucose 07/06/2017 123    • GFR If  07/06/2017 >60    • GFR If Non  Ameri* 07/06/2017 55*      Lab Results   Component Value Date/Time    GLYCOHEMOGLOBIN 5.9* 07/06/2017 09:47 AM    GLYCOHEMOGLOBIN 5.8* 12/15/2016 08:04 AM     Lab Results   Component Value Date/Time    SODIUM 138 07/06/2017 09:47 AM    POTASSIUM 4.3 07/06/2017 09:47 AM    CHLORIDE 101 07/06/2017 09:47 AM    CO2 24 07/06/2017 09:47 AM    GLUCOSE 85 07/06/2017 09:47 AM    BUN 10 07/06/2017 09:47 AM    CREATININE 1.27 07/06/2017 09:47 AM    BUN-CREATININE RATIO 8* 11/30/2015 02:32 PM    ALKALINE PHOSPHATASE 55 07/06/2017 09:47 AM    AST(SGOT) 61* 07/06/2017 09:47 AM    ALT(SGPT) 70* 07/06/2017 09:47 AM    TOTAL BILIRUBIN 0.6  07/06/2017 09:47 AM     No results found for: INR  Lab Results   Component Value Date/Time    CHOLESTEROL, 07/06/2017 09:47 AM    LDL 31 07/06/2017 09:47 AM    HDL 67 07/06/2017 09:47 AM    TRIGLYCERIDES 63 07/06/2017 09:47 AM       Lab Results   Component Value Date/Time    TESTOSTERONE,TOTAL 553 09/05/2013 09:07 AM     No results found for: TSH  Lab Results   Component Value Date/Time    FREE T-4 0.94 09/08/2014 08:43 AM    FREE T-4 0.76 02/10/2014 08:57 AM     Lab Results   Component Value Date/Time    URIC ACID 8.5* 12/15/2016 08:04 AM     No components found for: VITB12  Lab Results   Component Value Date/Time    25-HYDROXY   VITAMIN D 25 44 12/15/2016 08:04 AM    25-HYDROXY   VITAMIN D 25 49 06/16/2016 09:35 AM                   Assessment/Plan:     1. Controlled type 2 diabetes mellitus with complication, without long-term current use of insulin (CMS-HCC)   Under good control. Continue same regimen.    - Diabetic Monofilament LE Exam  - HEMOGLOBIN A1C; Future  - COMP METABOLIC PANEL; Future  - LIPID PROFILE; Future  - MICROALBUMIN CREAT RATIO URINE; Future  - metformin (GLUCOPHAGE) 500 MG Tab; Take 1 Tab by mouth every day.  Dispense: 90 Tab; Refill: 4    2. Idiopathic chronic gout of multiple sites without tophus Under good control. Continue same regimen.       3. Mixed hyperlipidemia Under good control. Continue same regimen.   - atorvastatin (LIPITOR) 80 MG tablet; Take 1 Tab by mouth every evening.  Dispense: 90 Tab; Refill: 4    4. Essential hypertension Under good control. Continue same regimen.     - amlodipine (NORVASC) 5 MG Tab; Take 1 Tab by mouth every day.  Dispense: 90 Tab; Refill: 4  - lisinopril (PRINIVIL) 20 MG Tab; Take 1 Tab by mouth every day.  Dispense: 90 Tab; Refill: 4    5. CKD (chronic kidney disease) stage 3, GFR 30-59 ml/min Under good control. Continue same regimen.       6. Gastroesophageal reflux disease with esophagitis      - omeprazole (PRILOSEC) 20 MG delayed-release  capsule; Take 1 Cap by mouth every day.  Dispense: 90 Cap; Refill: 4    7. Erectile dysfunction due to arterial insufficiency         8. Rash of entire body      - REFERRAL TO DERMATOLOGY        40 minute face-to-face encounter took place today.  More than half of this time was spent in the coordination of care of the above problems, as well as counseling.

## 2017-07-14 ENCOUNTER — PATIENT MESSAGE (OUTPATIENT)
Dept: MEDICAL GROUP | Age: 72
End: 2017-07-14

## 2018-01-11 ENCOUNTER — PATIENT OUTREACH (OUTPATIENT)
Dept: HEALTH INFORMATION MANAGEMENT | Facility: OTHER | Age: 73
End: 2018-01-11

## 2018-01-11 NOTE — PROGRESS NOTES
1. Attempt #Final    2. [facility] Verified: yes    3. Verify PCP: yes    4. Care Team Updated:       •   DME Company (gait device, O2, CPAP, etc.): NO       •   Other Specialists (eye doctor, derm, GYN, cardiology, endo, etc): YES    5.  Reviewed/Updated the following with patient:       •   Communication Preference Obtained? YES       •   Preferred Pharmacy? YES       •   Preferred Lab? YES       •   Family History (document living status of immediate family members and if + hx of cancer, diabetes, hypertension, hyperlipidemia, heart attack, stroke) YES. Was Abstract Encounter opened and chart updated? YES    6. [employer] Activation: already active    7. [employer] Ilir: yes    8. Annual Wellness Visit Scheduling  Scheduling Status:Scheduled      9. Care Gap Scheduling (Attempt to Schedule EACH Overdue Care Gap!)     Health Maintenance Due   Topic Date Due   • RETINAL SCREENING  03/10/2016// Requested record   • IMM INFLUENZA (1) 09/01/2017   • A1C SCREENING  01/06/2018// Already has orders pending        Scheduled patient for Annual Wellness Visit      10. Patient was advised: “This is a free wellness visit. The provider will screen for medical conditions to help you stay healthy. If you have other concerns to address you may be asked to discuss these at a separate visit or there may be an additional fee.”     11. Patient was informed to arrive 15 min prior to their scheduled appointment and bring in their medication bottles.

## 2018-01-15 ENCOUNTER — OFFICE VISIT (OUTPATIENT)
Dept: MEDICAL GROUP | Age: 73
End: 2018-01-15
Payer: MEDICARE

## 2018-01-15 VITALS
WEIGHT: 200.8 LBS | HEART RATE: 67 BPM | HEIGHT: 71 IN | BODY MASS INDEX: 28.11 KG/M2 | DIASTOLIC BLOOD PRESSURE: 70 MMHG | OXYGEN SATURATION: 97 % | SYSTOLIC BLOOD PRESSURE: 108 MMHG | TEMPERATURE: 98.4 F

## 2018-01-15 DIAGNOSIS — G60.3 IDIOPATHIC PROGRESSIVE NEUROPATHY: ICD-10-CM

## 2018-01-15 DIAGNOSIS — E11.8 CONTROLLED TYPE 2 DIABETES MELLITUS WITH COMPLICATION, WITHOUT LONG-TERM CURRENT USE OF INSULIN (HCC): ICD-10-CM

## 2018-01-15 DIAGNOSIS — R79.89 LOW VITAMIN D LEVEL: ICD-10-CM

## 2018-01-15 DIAGNOSIS — N18.30 CKD (CHRONIC KIDNEY DISEASE) STAGE 3, GFR 30-59 ML/MIN (HCC): ICD-10-CM

## 2018-01-15 DIAGNOSIS — M1A.09X0 IDIOPATHIC CHRONIC GOUT OF MULTIPLE SITES WITHOUT TOPHUS: ICD-10-CM

## 2018-01-15 DIAGNOSIS — E78.2 MIXED HYPERLIPIDEMIA: ICD-10-CM

## 2018-01-15 DIAGNOSIS — K21.00 GASTROESOPHAGEAL REFLUX DISEASE WITH ESOPHAGITIS: ICD-10-CM

## 2018-01-15 DIAGNOSIS — I10 ESSENTIAL HYPERTENSION: ICD-10-CM

## 2018-01-15 LAB
HBA1C MFR BLD: 5.5 % (ref ?–5.8)
INT CON NEG: NORMAL
INT CON POS: NORMAL

## 2018-01-15 PROCEDURE — 83036 HEMOGLOBIN GLYCOSYLATED A1C: CPT | Performed by: INTERNAL MEDICINE

## 2018-01-15 PROCEDURE — 99215 OFFICE O/P EST HI 40 MIN: CPT | Performed by: INTERNAL MEDICINE

## 2018-01-15 RX ORDER — MELOXICAM 7.5 MG/1
7.5 TABLET ORAL DAILY
Qty: 30 TAB | Refills: 11 | Status: SHIPPED | DISCHARGE
Start: 2018-01-15 | End: 2018-02-06

## 2018-01-15 RX ORDER — GABAPENTIN 300 MG/1
300 CAPSULE ORAL 2 TIMES DAILY
COMMUNITY
End: 2018-01-15 | Stop reason: SDUPTHER

## 2018-01-15 RX ORDER — ATORVASTATIN CALCIUM 80 MG/1
80 TABLET, FILM COATED ORAL EVERY EVENING
Qty: 90 TAB | Refills: 4
Start: 2018-01-15 | End: 2018-08-24 | Stop reason: SDUPTHER

## 2018-01-15 RX ORDER — GABAPENTIN 300 MG/1
300 CAPSULE ORAL 3 TIMES DAILY
Qty: 90 CAP | Refills: 11 | Status: ON HOLD | DISCHARGE
Start: 2018-01-15 | End: 2019-06-13

## 2018-01-15 RX ORDER — AMLODIPINE BESYLATE 5 MG/1
2.5 TABLET ORAL DAILY
Qty: 45 TAB | Refills: 4 | Status: SHIPPED | OUTPATIENT
Start: 2018-01-15 | End: 2018-08-24 | Stop reason: SDUPTHER

## 2018-01-15 RX ORDER — LISINOPRIL 20 MG/1
20 TABLET ORAL DAILY
Qty: 90 TAB | Refills: 4
Start: 2018-01-15 | End: 2018-08-24 | Stop reason: SDUPTHER

## 2018-01-15 RX ORDER — MELOXICAM 7.5 MG/1
7.5 TABLET ORAL DAILY
COMMUNITY
End: 2018-01-15 | Stop reason: SDUPTHER

## 2018-01-15 RX ORDER — OMEPRAZOLE 20 MG/1
20 CAPSULE, DELAYED RELEASE ORAL DAILY
Qty: 90 CAP | Refills: 4 | Status: ON HOLD
Start: 2018-01-15 | End: 2019-06-13

## 2018-01-15 ASSESSMENT — ENCOUNTER SYMPTOMS
MUSCULOSKELETAL NEGATIVE: 1
GASTROINTESTINAL NEGATIVE: 1
CONSTITUTIONAL NEGATIVE: 1
EYES NEGATIVE: 1
RESPIRATORY NEGATIVE: 1
PSYCHIATRIC NEGATIVE: 1
NEUROLOGICAL NEGATIVE: 1
CARDIOVASCULAR NEGATIVE: 1

## 2018-01-15 NOTE — PROGRESS NOTES
RN-SUPAE Note    Subjective:     Health changes since last visit/interval Hx: states he developed neuropathy in the past month, states he went to ED at [facility] due to severe burning in his feet.  Was prescribed Mobic and Gabapentin.    States his HCP at [facility] took him off of the Metformin in September to see what would happen with his A1c  Medications (including changes made today)  Current Outpatient Prescriptions   Medication Sig Dispense Refill   • meloxicam (MOBIC) 7.5 MG Tab Take 7.5 mg by mouth every day.     • gabapentin (NEURONTIN) 300 MG Cap Take 300 mg by mouth 2 times a day.     • Levomefolate Glucosamine (METHYLFOLATE PO) Take 1,000 mg by mouth every day.     • atorvastatin (LIPITOR) 80 MG tablet Take 1 Tab by mouth every evening. 90 Tab 4   • amlodipine (NORVASC) 5 MG Tab Take 1 Tab by mouth every day. (Patient taking differently: Take 2.5 mg by mouth every day.) 90 Tab 4   • lisinopril (PRINIVIL) 20 MG Tab Take 1 Tab by mouth every day. 90 Tab 4   • omeprazole (PRILOSEC) 20 MG delayed-release capsule Take 1 Cap by mouth every day. 90 Cap 4   • Potassium (POTASSIMIN PO) Take  by mouth.     • Cholecalciferol (VITAMIN D) 1000 UNIT CAPS Take 1 Cap by mouth every day. 100 Cap 11   • cyanocobalamin (VITAMIN B12) 1000 MCG TABS Take 1 Tab by mouth every day. 100 Each 11   • aspirin EC (ECOTRIN) 81 MG TBEC Take 81 mg by mouth every day.     • metformin (GLUCOPHAGE) 500 MG Tab Take 1 Tab by mouth every day. 90 Tab 4     No current facility-administered medications for this visit.        Taking daily ASA: Yes  Taking above medications as prescribed: Yes  Patient Denies side effects of medication.    Exercise: walking his dog for about 20-30 minutes daily  Diet: well balanced    Health Maintenance:   Health Maintenance Topics with due status: Overdue       Topic Date Due    RETINAL SCREENING 03/10/2016    IMM INFLUENZA 09/01/2017    A1C SCREENING 01/06/2018       Immunizations:   PPSV23: up to date  [id number]: up to  date  Tdap: up to date  Flu: up to date      DM:   Last A1c:   Lab Results   Component Value Date/Time    HBA1C 5.5 01/15/2018 10:02 AM      A1c goal: < 6    Glucose monitoring frequency: states he checks on occasion.  States he checked a week ago and his blood sugar was 111      Hypoglycemic episodes: no     Last Retinal Exam: states he had cataract surgery this past year Provider: request was sent on 1/11/18 for results.   Daily Foot Exam: yes  Routine Dental Exams: yes    Lab Results   Component Value Date/Time    MALBCRT 3 07/06/2017 09:47 AM    MICROALBUR 0.7 07/06/2017 09:47 AM    MICRALB 27.2 (H) 04/11/2014 12:04 PM        ACR Albumin/Creatinine Ratio goal <30 at goal        HTN:   Blood pressure goal <140/<80 at goal.   Currently Rx ACE/ARB: Yes    Dyslipidemia:    Lab Results   Component Value Date/Time    CHOLSTRLTOT 111 07/06/2017 09:47 AM    LDL 31 07/06/2017 09:47 AM    HDL 67 07/06/2017 09:47 AM    TRIGLYCERIDE 63 07/06/2017 09:47 AM       Lab Results   Component Value Date/Time    SODIUM 138 07/06/2017 09:47 AM    POTASSIUM 4.3 07/06/2017 09:47 AM    CHLORIDE 101 07/06/2017 09:47 AM    CO2 24 07/06/2017 09:47 AM    GLUCOSE 85 07/06/2017 09:47 AM    BUN 10 07/06/2017 09:47 AM    CREATININE 1.27 07/06/2017 09:47 AM    BUNCREATRAT 8 (L) 11/30/2015 02:32 PM     Lab Results   Component Value Date/Time    ALKPHOSPHAT 55 07/06/2017 09:47 AM    ASTSGOT 61 (H) 07/06/2017 09:47 AM    ALTSGPT 70 (H) 07/06/2017 09:47 AM    TBILIRUBIN 0.6 07/06/2017 09:47 AM        Currently Rx Statin: Yes      He  reports that he quit smoking about 37 years ago. His smoking use included Cigarettes. He has a 28.00 pack-year smoking history. He has never used smokeless tobacco.    Objective:     Exam:  Monofilament: not done    Visual Inspection: Feet without maceration, ulcers, fissures.  Pedal pulses: intact bilaterally      Plan:     Discussed Annual eye examinations at Ophthalmology  Diabetic diet discussed in detail, written  exchange diet given  Foot care discussed and Podiatry visits  Home glucose monitoring emphasized. Discussed foot care  Encouraged aerobic exercise        Patient educated on:  - Exercise  - Factors Affecting Blood Glucose Control: food, illness, medication and stress  - Foot Care: what to look for when checking feet every day and when to contact HCP  - HbA1C: target and what A1C is    Recommended medication changes: none at this time  Recommend buying some OTC cream with capsaicin cream to help with burning pain in his feet.   He was also instructed to take Gabapentiin at dinner and again a bedtime to help him get a good night sleep from neuropathy

## 2018-01-15 NOTE — PROGRESS NOTES
Subjective:      Corbin Rodríguez is a 72 y.o. male who presents with Diabetes Mellitus; Hypertension; and Hyperlipidemia  The patient is here for followup of chronic medical problems listed below. The patient is compliant with medications and having no side effects from them. Denies chest pain, abdominal pain, dyspnea, myalgias, or cough.   Patient Active Problem List    Diagnosis Date Noted   • Idiopathic progressive neuropathy 01/15/2018   • Cataract, diabetic (CMS-HCC) 02/14/2017   • Bilateral edema of lower extremity 12/19/2016   • Retinal hemorrhage due to secondary diabetes- dr silverman in [place] , 2009- resolved 02/09/2016   • Idiopathic chronic gout of multiple sites without tophus 12/04/2015   • CKD (chronic kidney disease) stage 3, GFR 30-59 ml/min 03/09/2015   • Gastroesophageal reflux disease with esophagitis 09/17/2014   • Controlled type 2 diabetes mellitus with complication, without long-term current use of insulin (CMS-HCC) 12/16/2013   • Mixed hyperlipidemia 04/04/2012   • Essential hypertension 04/04/2012   • ED (erectile dysfunction) 04/04/2012     No Known Allergies  Outpatient Medications Prior to Visit   Medication Sig Dispense Refill   • Potassium (POTASSIMIN PO) Take  by mouth.     • Cholecalciferol (VITAMIN D) 1000 UNIT CAPS Take 1 Cap by mouth every day. 100 Cap 11   • cyanocobalamin (VITAMIN B12) 1000 MCG TABS Take 1 Tab by mouth every day. 100 Each 11   • aspirin EC (ECOTRIN) 81 MG TBEC Take 81 mg by mouth every day.     • metformin (GLUCOPHAGE) 500 MG Tab Take 1 Tab by mouth every day. 90 Tab 4   • atorvastatin (LIPITOR) 80 MG tablet Take 1 Tab by mouth every evening. 90 Tab 4   • amlodipine (NORVASC) 5 MG Tab Take 1 Tab by mouth every day. (Patient taking differently: Take 2.5 mg by mouth every day.) 90 Tab 4   • lisinopril (PRINIVIL) 20 MG Tab Take 1 Tab by mouth every day. 90 Tab 4   • omeprazole (PRILOSEC) 20 MG delayed-release capsule Take 1 Cap by mouth every day. 90 Cap 4  "    No facility-administered medications prior to visit.                HPI    Review of Systems   Constitutional: Negative.    HENT: Negative.    Eyes: Negative.    Respiratory: Negative.    Cardiovascular: Negative.    Gastrointestinal: Negative.    Genitourinary: Negative.    Musculoskeletal: Negative.    Skin: Negative.    Neurological: Negative.    Endo/Heme/Allergies: Negative.    Psychiatric/Behavioral: Negative.           Objective:     /70   Pulse 67   Temp 36.9 °C (98.4 °F)   Ht 1.803 m (5' 11\")   Wt 91.1 kg (200 lb 12.8 oz)   SpO2 97%   BMI 28.01 kg/m²      Physical Exam   Constitutional: He is oriented to person, place, and time. He appears well-developed and well-nourished. No distress.   HENT:   Head: Normocephalic and atraumatic.   Right Ear: External ear normal.   Left Ear: External ear normal.   Nose: Nose normal.   Mouth/Throat: Oropharynx is clear and moist. No oropharyngeal exudate.   Eyes: Conjunctivae and EOM are normal. Pupils are equal, round, and reactive to light. Right eye exhibits no discharge. Left eye exhibits no discharge. No scleral icterus.   Neck: Normal range of motion. Neck supple. No JVD present. No tracheal deviation present. No thyromegaly present.   Cardiovascular: Normal rate, regular rhythm, normal heart sounds and intact distal pulses.  Exam reveals no gallop and no friction rub.    No murmur heard.  Pulmonary/Chest: Effort normal and breath sounds normal. No stridor. No respiratory distress. He has no wheezes. He has no rales. He exhibits no tenderness.   Abdominal: Soft. Bowel sounds are normal. He exhibits no distension and no mass. There is no tenderness. There is no rebound and no guarding.   Musculoskeletal: Normal range of motion. He exhibits no edema or tenderness.   Lymphadenopathy:     He has no cervical adenopathy.   Neurological: He is alert and oriented to person, place, and time. He has normal reflexes. He displays normal reflexes. He exhibits " normal muscle tone. Coordination normal.   Skin: Skin is warm and dry. No rash noted. He is not diaphoretic. No erythema. No pallor.   Psychiatric: He has a normal mood and affect. His behavior is normal. Judgment and thought content normal.     No visits with results within 1 Month(s) from this visit.   Latest known visit with results is:   Hospital Outpatient Visit on 07/06/2017   Component Date Value   • Creatinine, Urine 07/06/2017 234.40    • Microalbumin, Urine Rand* 07/06/2017 0.7    • Micro Alb Creat Ratio 07/06/2017 3    • Cholesterol,Tot 07/06/2017 111    • Triglycerides 07/06/2017 63    • HDL 07/06/2017 67    • LDL 07/06/2017 31    • Sodium 07/06/2017 138    • Potassium 07/06/2017 4.3    • Chloride 07/06/2017 101    • Co2 07/06/2017 24    • Anion Gap 07/06/2017 13.0*   • Glucose 07/06/2017 85    • Bun 07/06/2017 10    • Creatinine 07/06/2017 1.27    • Calcium 07/06/2017 8.8    • AST(SGOT) 07/06/2017 61*   • ALT(SGPT) 07/06/2017 70*   • Alkaline Phosphatase 07/06/2017 55    • Total Bilirubin 07/06/2017 0.6    • Albumin 07/06/2017 4.0    • Total Protein 07/06/2017 6.5    • Globulin 07/06/2017 2.5    • A-G Ratio 07/06/2017 1.6    • Glycohemoglobin 07/06/2017 5.9*   • Est Avg Glucose 07/06/2017 123    • GFR If  07/06/2017 >60    • GFR If Non  Ameri* 07/06/2017 55*      Lab Results   Component Value Date/Time    HBA1C 5.5 01/15/2018 10:02 AM    HBA1C 5.9 (H) 07/06/2017 09:47 AM     Lab Results   Component Value Date/Time    SODIUM 138 07/06/2017 09:47 AM    POTASSIUM 4.3 07/06/2017 09:47 AM    CHLORIDE 101 07/06/2017 09:47 AM    CO2 24 07/06/2017 09:47 AM    GLUCOSE 85 07/06/2017 09:47 AM    BUN 10 07/06/2017 09:47 AM    CREATININE 1.27 07/06/2017 09:47 AM    BUNCREATRAT 8 (L) 11/30/2015 02:32 PM    ALKPHOSPHAT 55 07/06/2017 09:47 AM    ASTSGOT 61 (H) 07/06/2017 09:47 AM    ALTSGPT 70 (H) 07/06/2017 09:47 AM    TBILIRUBIN 0.6 07/06/2017 09:47 AM     No results found for: INR  Lab Results    Component Value Date/Time    CHOLSTRLTOT 111 07/06/2017 09:47 AM    LDL 31 07/06/2017 09:47 AM    HDL 67 07/06/2017 09:47 AM    TRIGLYCERIDE 63 07/06/2017 09:47 AM       Lab Results   Component Value Date/Time    TESTOSTERONE 553 09/05/2013 09:07 AM     No results found for: TSH  Lab Results   Component Value Date/Time    FREET4 0.94 09/08/2014 08:43 AM    FREET4 0.76 02/10/2014 08:57 AM     Lab Results   Component Value Date/Time    URICACID 8.5 (H) 12/15/2016 08:04 AM     No components found for: VITB12  Lab Results   Component Value Date/Time    25HYDROXY 44 12/15/2016 08:04 AM    25HYDROXY 49 06/16/2016 09:35 AM     '          Assessment/Plan:     1. Controlled type 2 diabetes mellitus with complication, without long-term current use of insulin (CMS-HCC)     Under good control. Continue same regimen.      - POCT Hemoglobin A1C  - COMP METABOLIC PANEL; Future  - HEMOGLOBIN A1C; Future  - LIPID PROFILE; Future  - MICROALBUMIN CREAT RATIO URINE; Future    2. Mixed hyperlipidemia       Under good control. Continue same regimen.- LIPID PROFILE; Future  - atorvastatin (LIPITOR) 80 MG tablet; Take 1 Tab by mouth every evening.  Dispense: 90 Tab; Refill: 4    3. Low vitamin D level   Under good control. Continue same regimen.      - VITAMIN D,25 HYDROXY; Future    4. Idiopathic progressive neuropathy       Under good control. Continue same regimen.  - gabapentin (NEURONTIN) 300 MG Cap; Take 1 Cap by mouth 3 times a day.  Dispense: 90 Cap; Refill: 11  - meloxicam (MOBIC) 7.5 MG Tab; Take 1 Tab by mouth every day.  Dispense: 30 Tab; Refill: 11    5. Essential hypertension   Under good control. Continue same regimen.     - amlodipine (NORVASC) 5 MG Tab; Take 0.5 Tabs by mouth every day.  Dispense: 45 Tab; Refill: 4  - lisinopril (PRINIVIL) 20 MG Tab; Take 1 Tab by mouth every day.  Dispense: 90 Tab; Refill: 4    6. CKD (chronic kidney disease) stage 3, GFR 30-59 ml/min   Under good control. Continue same regimen.        7. Idiopathic chronic gout of multiple sites without tophus   Under good control. Continue same regimen.        8. Gastroesophageal reflux disease with esophagitis   Under good control. Continue same regimen.      - omeprazole (PRILOSEC) 20 MG delayed-release capsule; Take 1 Cap by mouth every day.  Dispense: 90 Cap; Refill: 4      40 minute face-to-face encounter took place today.  More than half of this time was spent in the coordination of care of the above problems, as well as counseling.

## 2018-01-16 ENCOUNTER — HOSPITAL ENCOUNTER (OUTPATIENT)
Dept: LAB | Facility: MEDICAL CENTER | Age: 73
End: 2018-01-16
Attending: INTERNAL MEDICINE
Payer: MEDICARE

## 2018-01-16 DIAGNOSIS — E78.2 MIXED HYPERLIPIDEMIA: ICD-10-CM

## 2018-01-16 DIAGNOSIS — E11.8 CONTROLLED TYPE 2 DIABETES MELLITUS WITH COMPLICATION, WITHOUT LONG-TERM CURRENT USE OF INSULIN (HCC): ICD-10-CM

## 2018-01-16 DIAGNOSIS — R79.89 LOW VITAMIN D LEVEL: ICD-10-CM

## 2018-01-16 LAB
ALBUMIN SERPL BCP-MCNC: 3.4 G/DL (ref 3.2–4.9)
ALBUMIN/GLOB SERPL: 1.3 G/DL
ALP SERPL-CCNC: 62 U/L (ref 30–99)
ALT SERPL-CCNC: 31 U/L (ref 2–50)
ANION GAP SERPL CALC-SCNC: 12 MMOL/L (ref 0–11.9)
AST SERPL-CCNC: 27 U/L (ref 12–45)
BILIRUB SERPL-MCNC: 0.7 MG/DL (ref 0.1–1.5)
BUN SERPL-MCNC: 9 MG/DL (ref 8–22)
CALCIUM SERPL-MCNC: 8.7 MG/DL (ref 8.5–10.5)
CHLORIDE SERPL-SCNC: 108 MMOL/L (ref 96–112)
CHOLEST SERPL-MCNC: 107 MG/DL (ref 100–199)
CO2 SERPL-SCNC: 21 MMOL/L (ref 20–33)
CREAT SERPL-MCNC: 1.1 MG/DL (ref 0.5–1.4)
EST. AVERAGE GLUCOSE BLD GHB EST-MCNC: 117 MG/DL
GLOBULIN SER CALC-MCNC: 2.7 G/DL (ref 1.9–3.5)
GLUCOSE SERPL-MCNC: 102 MG/DL (ref 65–99)
HBA1C MFR BLD: 5.7 % (ref 0–5.6)
HDLC SERPL-MCNC: 49 MG/DL
LDLC SERPL CALC-MCNC: 42 MG/DL
POTASSIUM SERPL-SCNC: 3.4 MMOL/L (ref 3.6–5.5)
PROT SERPL-MCNC: 6.1 G/DL (ref 6–8.2)
SODIUM SERPL-SCNC: 141 MMOL/L (ref 135–145)
TRIGL SERPL-MCNC: 78 MG/DL (ref 0–149)

## 2018-01-16 PROCEDURE — 80061 LIPID PANEL: CPT

## 2018-01-16 PROCEDURE — 80053 COMPREHEN METABOLIC PANEL: CPT

## 2018-01-16 PROCEDURE — 36415 COLL VENOUS BLD VENIPUNCTURE: CPT

## 2018-01-16 PROCEDURE — 83036 HEMOGLOBIN GLYCOSYLATED A1C: CPT

## 2018-01-26 ENCOUNTER — TELEPHONE (OUTPATIENT)
Dept: MEDICAL GROUP | Age: 73
End: 2018-01-26

## 2018-01-26 NOTE — TELEPHONE ENCOUNTER
ANNUAL WELLNESS VISIT PRE-VISIT PLANNING WITH OUTREACH    1.  Immunizations were updated in Epic using WebIZ?:Yes       •  WebIZ Recommendations: FLU       •  Is patient due for Tdap? NO       •  Is patient due for Shingles?NO    2.  MDX printed and highlighted for Provider? NO

## 2018-02-06 ENCOUNTER — OFFICE VISIT (OUTPATIENT)
Dept: MEDICAL GROUP | Age: 73
End: 2018-02-06
Payer: MEDICARE

## 2018-02-06 VITALS
OXYGEN SATURATION: 93 % | BODY MASS INDEX: 29.06 KG/M2 | HEART RATE: 86 BPM | WEIGHT: 207.6 LBS | DIASTOLIC BLOOD PRESSURE: 72 MMHG | HEIGHT: 71 IN | SYSTOLIC BLOOD PRESSURE: 128 MMHG | TEMPERATURE: 98.2 F

## 2018-02-06 DIAGNOSIS — R60.0 BILATERAL EDEMA OF LOWER EXTREMITY: ICD-10-CM

## 2018-02-06 DIAGNOSIS — G60.3 IDIOPATHIC PROGRESSIVE NEUROPATHY: ICD-10-CM

## 2018-02-06 DIAGNOSIS — E78.2 MIXED HYPERLIPIDEMIA: ICD-10-CM

## 2018-02-06 DIAGNOSIS — H35.049 RETINAL HEMORRHAGE DUE TO SECONDARY DIABETES (HCC): ICD-10-CM

## 2018-02-06 DIAGNOSIS — E13.319 RETINAL HEMORRHAGE DUE TO SECONDARY DIABETES (HCC): ICD-10-CM

## 2018-02-06 DIAGNOSIS — R73.01 IFG (IMPAIRED FASTING GLUCOSE): ICD-10-CM

## 2018-02-06 DIAGNOSIS — I10 ESSENTIAL HYPERTENSION: ICD-10-CM

## 2018-02-06 DIAGNOSIS — M1A.09X0 IDIOPATHIC CHRONIC GOUT OF MULTIPLE SITES WITHOUT TOPHUS: ICD-10-CM

## 2018-02-06 DIAGNOSIS — N52.01 ERECTILE DYSFUNCTION DUE TO ARTERIAL INSUFFICIENCY: ICD-10-CM

## 2018-02-06 DIAGNOSIS — N18.30 CKD (CHRONIC KIDNEY DISEASE) STAGE 3, GFR 30-59 ML/MIN (HCC): ICD-10-CM

## 2018-02-06 DIAGNOSIS — K21.00 GASTROESOPHAGEAL REFLUX DISEASE WITH ESOPHAGITIS: ICD-10-CM

## 2018-02-06 DIAGNOSIS — Z00.00 MEDICARE ANNUAL WELLNESS VISIT, SUBSEQUENT: ICD-10-CM

## 2018-02-06 DIAGNOSIS — E11.36 CATARACT, DIABETIC (HCC): ICD-10-CM

## 2018-02-06 PROCEDURE — G0439 PPPS, SUBSEQ VISIT: HCPCS | Performed by: INTERNAL MEDICINE

## 2018-02-06 ASSESSMENT — PATIENT HEALTH QUESTIONNAIRE - PHQ9: CLINICAL INTERPRETATION OF PHQ2 SCORE: 0

## 2018-02-06 ASSESSMENT — PAIN SCALES - GENERAL: PAINLEVEL: 4=SLIGHT-MODERATE PAIN

## 2018-02-06 NOTE — PROGRESS NOTES
Chief Complaint   Patient presents with   • Annual Wellness Visit     SCP         HPI:  Corbin is a 72 y.o. here for Medicare Annual Wellness Visit         Patient Active Problem List    Diagnosis Date Noted   • Idiopathic progressive neuropathy 01/15/2018   • Cataract, diabetic (CMS-HCC) 02/14/2017   • Bilateral edema of lower extremity 12/19/2016   • Retinal hemorrhage due to secondary diabetes- dr silverman in [place] , 2009- resolved 02/09/2016   • Idiopathic chronic gout of multiple sites without tophus 12/04/2015   • CKD (chronic kidney disease) stage 3, GFR 30-59 ml/min 03/09/2015   • Gastroesophageal reflux disease with esophagitis 09/17/2014   • Controlled type 2 diabetes mellitus with complication, without long-term current use of insulin (CMS-HCC) 12/16/2013   • Mixed hyperlipidemia 04/04/2012   • Essential hypertension 04/04/2012   • ED (erectile dysfunction) 04/04/2012       Current Outpatient Prescriptions   Medication Sig Dispense Refill   • Levomefolate Glucosamine (METHYLFOLATE PO) Take 1,000 mg by mouth every day.     • gabapentin (NEURONTIN) 300 MG Cap Take 1 Cap by mouth 3 times a day. 90 Cap 11   • meloxicam (MOBIC) 7.5 MG Tab Take 1 Tab by mouth every day. 30 Tab 11   • amlodipine (NORVASC) 5 MG Tab Take 0.5 Tabs by mouth every day. 45 Tab 4   • atorvastatin (LIPITOR) 80 MG tablet Take 1 Tab by mouth every evening. 90 Tab 4   • lisinopril (PRINIVIL) 20 MG Tab Take 1 Tab by mouth every day. 90 Tab 4   • Potassium (POTASSIMIN PO) Take  by mouth.     • Cholecalciferol (VITAMIN D) 1000 UNIT CAPS Take 1 Cap by mouth every day. 100 Cap 11   • cyanocobalamin (VITAMIN B12) 1000 MCG TABS Take 1 Tab by mouth every day. 100 Each 11   • aspirin EC (ECOTRIN) 81 MG TBEC Take 81 mg by mouth every day.     • omeprazole (PRILOSEC) 20 MG delayed-release capsule Take 1 Cap by mouth every day. 90 Cap 4   • metformin (GLUCOPHAGE) 500 MG Tab Take 1 Tab by mouth every day. (Patient not taking: Reported on 2/6/2018) 90  Tab 4     No current facility-administered medications for this visit.         Patient is taking medications as noted in medication list.  Current supplements as per medication list.     Allergies: Patient has no known allergies.    Current social contact/activities: out for dinner       Is patient current with immunizations? Yes.    He  reports that he quit smoking about 37 years ago. His smoking use included Cigarettes. He has a 28.00 pack-year smoking history. He has never used smokeless tobacco. He reports that he does not drink alcohol or use drugs.  Counseling given: Not Answered        DPA/Advanced directive: Patient does not have an Advanced Directive.  A packet and workshop information was given on Advanced Directives.    ROS:    Gait: Uses no assistive device    Ostomy: no    Other tubes: no     Amputations: no    Chronic oxygen use no     Last eye exam 09/2017    Wears hearing aids: no    : Denies any urinary leakage during the last 6 months        Screening:         Depression Screening    Little interest or pleasure in doing things?  0 - not at all  Feeling down, depressed, or hopeless? 0 - not at all  Patient Health Questionnaire Score: 0    If depressive symptoms identified deferred to follow up visit unless specifically addressed in assessment and plan.    Interpretation of PHQ-9 Total Score   Score Severity   1-4 No Depression   5-9 Mild Depression   10-14 Moderate Depression   15-19 Moderately Severe Depression   20-27 Severe Depression    Screening for Cognitive Impairment    Three Minute Recall (apple, watch, mitchel)  2/3 2/3 Table leader sunset  Draw clock face with all 12 numbers set to the hand to show 10 minutes past 11 o'clock  1 5/5  If cognitive concerns identified, deferred for follow up unless specifically addressed in assessment and plan.    Fall Risk Assessment    Has the patient had two or more falls in the last year or any fall with injury in the last year?  No  If fall risk  identified, deferred for follow up unless specifically addressed in assessment and plan.    Safety Assessment    Throw rugs on floor.  Yes  Handrails on all stairs.  Yes  Good lighting in all hallways.  Yes  Difficulty hearing.  No  Patient counseled about all safety risks that were identified.    Functional Assessment ADLs    Are there any barriers preventing you from cooking for yourself or meeting nutritional needs?  No.    Are there any barriers preventing you from driving safely or obtaining transportation?  No.    Are there any barriers preventing you from using a telephone or calling for help?  No.    Are there any barriers preventing you from shopping?  No.    Are there any barriers preventing you from taking care of your own finances?  No.    Are there any barriers preventing you from managing your medications?  No.    Are you currently engaging any exercise or physical activity?  Yes.  Walk dog/yard work    Health Maintenance Summary                Annual Wellness Visit Next Due 2/15/2018      Done 2/14/2017 Visit Dx: Medicare annual wellness visit, subsequent     Patient has more history with this topic...    URINE ACR / MICROALBUMIN Next Due 7/6/2018      Done 7/6/2017 MICROALBUMIN CREAT RATIO URINE     Patient has more history with this topic...    DIABETES MONOFILAMENT / LE EXAM Next Due 7/10/2018      Done 7/10/2017 AMB DIABETIC MONOFILAMENT LOWER EXTREMITY EXAM     Patient has more history with this topic...    A1C SCREENING Next Due 7/16/2018      Done 1/16/2018 HEMOGLOBIN A1C      Patient has more history with this topic...    COLONOSCOPY Next Due 8/31/2018      Done 8/31/2015 AMB REFERRAL TO GI FOR COLONOSCOPY    RETINAL SCREENING Next Due 9/11/2018      Done 9/11/2017 REFERRAL FOR RETINAL SCREENING EXAM     Patient has more history with this topic...    FASTING LIPID PROFILE Next Due 1/16/2019      Done 1/16/2018 LIPID PROFILE     Patient has more history with this topic...    SERUM CREATININE  "Next Due 1/16/2019      Done 1/16/2018 COMP METABOLIC PANEL      Patient has more history with this topic...    IMM DTaP/Tdap/Td Vaccine Next Due 1/30/2027      Done 1/30/2017 Imm Admin: Tdap Vaccine          Patient Care Team:  Juliocesar Piedra M.D. as PCP - General (Internal Medicine)  Juliocesar Leggett M.D. as Consulting Physician (Gastroenterology)  Meghan Marroquin M.D. as Consulting Physician (Ophthalmology)  [facility] as Consulting Physician (Optometry)  Dr. Rdz as Medical Home Care Manager    Social History   Substance Use Topics   • Smoking status: Former Smoker     Packs/day: 2.00     Years: 14.00     Types: Cigarettes     Quit date: 12/31/1980   • Smokeless tobacco: Never Used      Comment: stopped 1980   • Alcohol use No     Family History   Problem Relation Age of Onset   • Lung Disease Mother      emphazmia   • Alcohol/Drug Father    • Hypertension Father    • Hyperlipidemia Father    • Hypertension Brother    • Other Maternal Grandfather      Parkinson's   • Other Brother      Heart valve surgery/ heart valve defect from birth.   • Heart Disease Brother      He  has a past medical history of DJD (degenerative joint disease); Hyperlipidemia; Hypertension; Impaired fasting glucose; and Osteoarthritis.   Past Surgical History:   Procedure Laterality Date   • PB INJ DX/THER AGNT PARAVERT FACET JOINT, KIRSTIN*  5/22/2014    Performed by Maurizio Coleman M.D. at [facility]   • PB INJ DX/THER AGNT PARAVERT FACET JOINT, CE*  5/22/2014    Performed by Maurizio Coleman M.D. at [facility]   • PB INJ DX/THER AGNT PARAVERT FACET JOINT, KIRSTIN*  5/22/2014    Performed by Maurizio Coleman M.D. at [facility]   • POLPECTOMY, LARGE INTESTINE             Exam:     Blood pressure 128/72, pulse 86, temperature 36.8 °C (98.2 °F), height 1.803 m (5' 11\"), weight 94.2 kg (207 lb 9.6 oz), SpO2 93 %. Body mass index is 28.95 kg/m².    Hearing excellent.    Dentition good  Alert, " oriented in no acute distress.  Eye contact is good, speech goal directed, affect calm       Assessment and Plan. The following treatment and monitoring plan is recommended:        1. Medicare annual wellness visit, subsequent     - [place] ANNUAL WELLNESS VISIT-INCLUDES PPPS SUBSEQUE*    2. IFG (impaired fasting glucose)    Under good control. Continue same regimen. PT NO LONGER HAS DIABETES- A1C=5.7 CCO=524 OFF ALL GLUCOSE LOWERING MEDS X 9 MOS    - HEMOGLOBIN A1C; Future  - MS ANNUAL WELLNESS VISIT-INCLUDES PPPS SUBSEQUE*    3. Mixed hyperlipidemia    Under good control. Continue same regimen.  - COMP METABOLIC PANEL; Future  - LIPID PROFILE; Future  - CBC WITH DIFFERENTIAL; Future  - MS ANNUAL WELLNESS VISIT-INCLUDES PPPS SUBSEQUE*    4. Essential hypertension   Under good control. Continue same regimen.  - MS ANNUAL WELLNESS VISIT-INCLUDES PPPS SUBSEQUE*    5. Erectile dysfunction due to arterial insufficiency     - MS ANNUAL WELLNESS VISIT-INCLUDES PPPS SUBSEQUE*    6. Gastroesophageal reflux disease with esophagitis   Under good control. Continue same regimen.  - MS ANNUAL WELLNESS VISIT-INCLUDES PPPS SUBSEQUE*    7. CKD (chronic kidney disease) stage 3, GFR 30-59 ml/min   Under good control. Continue same regimen.  - MS ANNUAL WELLNESS VISIT-INCLUDES PPPS SUBSEQUE*    8. Idiopathic chronic gout of multiple sites without tophus   Under good control. Continue same regimen.  - URIC ACID; Future  - MS ANNUAL WELLNESS VISIT-INCLUDES PPPS SUBSEQUE*    9. Retinal hemorrhage due to secondary diabetes- dr silverman in [place] , 2009- resolved    Under good control. Continue same regimen.  - MS ANNUAL WELLNESS VISIT-INCLUDES PPPS SUBSEQUE*    10. Bilateral edema of lower extremity    Under good control. Continue same regimen.  - MS ANNUAL WELLNESS VISIT-INCLUDES PPPS SUBSEQUE*    11. Cataract, diabetic (CMS-HCC)   Under good control. Continue same regimen.  - MS ANNUAL WELLNESS VISIT-INCLUDES PPPS SUBSEQUE*    12. Idiopathic  progressive neuropathy    Under good control. Continue same regimen.  - [place] ANNUAL WELLNESS VISIT-INCLUDES PPPS SUBSEQUE*        Services suggested: No services needed at this time  Health Care Screening recommendations as per orders if indicated.  Referrals offered: PT/OT/Nutrition counseling/Behavioral Health/Smoking cessation as per orders if indicated.    Discussion today about general wellness and lifestyle habits:    · Prevent falls and reduce trip hazards; Cautioned about securing or removing rugs.  · Have a working fire alarm and carbon monoxide detector;   · Engage in regular physical activity and social activities       Follow-up: No Follow-up on file.

## 2018-08-03 ENCOUNTER — HOSPITAL ENCOUNTER (OUTPATIENT)
Dept: LAB | Facility: MEDICAL CENTER | Age: 73
End: 2018-08-03
Attending: INTERNAL MEDICINE
Payer: MEDICARE

## 2018-08-03 DIAGNOSIS — M1A.09X0 IDIOPATHIC CHRONIC GOUT OF MULTIPLE SITES WITHOUT TOPHUS: ICD-10-CM

## 2018-08-03 DIAGNOSIS — E78.2 MIXED HYPERLIPIDEMIA: ICD-10-CM

## 2018-08-03 DIAGNOSIS — R73.01 IFG (IMPAIRED FASTING GLUCOSE): ICD-10-CM

## 2018-08-03 LAB
ALBUMIN SERPL BCP-MCNC: 4.5 G/DL (ref 3.2–4.9)
ALBUMIN/GLOB SERPL: 2 G/DL
ALP SERPL-CCNC: 70 U/L (ref 30–99)
ALT SERPL-CCNC: 62 U/L (ref 2–50)
ANION GAP SERPL CALC-SCNC: 11 MMOL/L (ref 0–11.9)
AST SERPL-CCNC: 47 U/L (ref 12–45)
BASOPHILS # BLD AUTO: 0.4 % (ref 0–1.8)
BASOPHILS # BLD: 0.02 K/UL (ref 0–0.12)
BILIRUB SERPL-MCNC: 0.6 MG/DL (ref 0.1–1.5)
BUN SERPL-MCNC: 12 MG/DL (ref 8–22)
CALCIUM SERPL-MCNC: 9.2 MG/DL (ref 8.5–10.5)
CHLORIDE SERPL-SCNC: 105 MMOL/L (ref 96–112)
CHOLEST SERPL-MCNC: 112 MG/DL (ref 100–199)
CO2 SERPL-SCNC: 25 MMOL/L (ref 20–33)
CREAT SERPL-MCNC: 1.13 MG/DL (ref 0.5–1.4)
EOSINOPHIL # BLD AUTO: 0.08 K/UL (ref 0–0.51)
EOSINOPHIL NFR BLD: 1.6 % (ref 0–6.9)
ERYTHROCYTE [DISTWIDTH] IN BLOOD BY AUTOMATED COUNT: 49 FL (ref 35.9–50)
EST. AVERAGE GLUCOSE BLD GHB EST-MCNC: 137 MG/DL
GLOBULIN SER CALC-MCNC: 2.3 G/DL (ref 1.9–3.5)
GLUCOSE SERPL-MCNC: 119 MG/DL (ref 65–99)
HBA1C MFR BLD: 6.4 % (ref 0–5.6)
HCT VFR BLD AUTO: 43.9 % (ref 42–52)
HDLC SERPL-MCNC: 52 MG/DL
HGB BLD-MCNC: 15.3 G/DL (ref 14–18)
IMM GRANULOCYTES # BLD AUTO: 0.01 K/UL (ref 0–0.11)
IMM GRANULOCYTES NFR BLD AUTO: 0.2 % (ref 0–0.9)
LDLC SERPL CALC-MCNC: 40 MG/DL
LYMPHOCYTES # BLD AUTO: 1.82 K/UL (ref 1–4.8)
LYMPHOCYTES NFR BLD: 35.3 % (ref 22–41)
MCH RBC QN AUTO: 32 PG (ref 27–33)
MCHC RBC AUTO-ENTMCNC: 34.9 G/DL (ref 33.7–35.3)
MCV RBC AUTO: 91.8 FL (ref 81.4–97.8)
MONOCYTES # BLD AUTO: 0.59 K/UL (ref 0–0.85)
MONOCYTES NFR BLD AUTO: 11.4 % (ref 0–13.4)
NEUTROPHILS # BLD AUTO: 2.64 K/UL (ref 1.82–7.42)
NEUTROPHILS NFR BLD: 51.1 % (ref 44–72)
NRBC # BLD AUTO: 0 K/UL
NRBC BLD-RTO: 0 /100 WBC
PLATELET # BLD AUTO: 154 K/UL (ref 164–446)
PMV BLD AUTO: 10.7 FL (ref 9–12.9)
POTASSIUM SERPL-SCNC: 4.4 MMOL/L (ref 3.6–5.5)
PROT SERPL-MCNC: 6.8 G/DL (ref 6–8.2)
RBC # BLD AUTO: 4.78 M/UL (ref 4.7–6.1)
SODIUM SERPL-SCNC: 141 MMOL/L (ref 135–145)
TRIGL SERPL-MCNC: 101 MG/DL (ref 0–149)
URATE SERPL-MCNC: 7.4 MG/DL (ref 2.5–8.3)
WBC # BLD AUTO: 5.2 K/UL (ref 4.8–10.8)

## 2018-08-03 PROCEDURE — 83036 HEMOGLOBIN GLYCOSYLATED A1C: CPT

## 2018-08-03 PROCEDURE — 84550 ASSAY OF BLOOD/URIC ACID: CPT

## 2018-08-03 PROCEDURE — 36415 COLL VENOUS BLD VENIPUNCTURE: CPT

## 2018-08-03 PROCEDURE — 80061 LIPID PANEL: CPT

## 2018-08-03 PROCEDURE — 80053 COMPREHEN METABOLIC PANEL: CPT

## 2018-08-03 PROCEDURE — 85025 COMPLETE CBC W/AUTO DIFF WBC: CPT

## 2018-08-06 ENCOUNTER — TELEPHONE (OUTPATIENT)
Dept: MEDICAL GROUP | Age: 73
End: 2018-08-06

## 2018-08-06 NOTE — TELEPHONE ENCOUNTER
ESTABLISHED PATIENT PRE-VISIT PLANNING     Note: Patient will not be contacted if there is no indication to call.     1.  Reviewed notes from the last few office visits within the medical group: Yes    2.  If any orders were placed at last visit or intended to be done for this visit (i.e. 6 mos follow-up), do we have Results/Consult Notes?        •  Labs - Labs ordered, completed on 8/3/18 and results are in chart.   Note: If patient appointment is for lab review and patient did not complete labs, check with provider if OK to reschedule patient until labs completed.       •  Imaging - Imaging was not ordered at last office visit.       •  Referrals - No referrals were ordered at last office visit.    3. Is this appointment scheduled as a Hospital Follow-Up? No    4.  Immunizations were updated in Epic using WebIZ?: Epic matches WebIZ       •  Web Iz Recommendations: Patient is up to date on all vaccines    5.  Patient is due for the following Health Maintenance Topics:   Health Maintenance Due   Topic Date Due   • IMM ZOSTER VACCINES (2 of 3) 06/30/2010   • COLONOSCOPY  08/31/2018           6.  MDX printed for Provider? NO  MDX complete 2/16/18    7.  Patient was NOT informed to arrive 15 min prior to their scheduled appointment and bring in their medication bottles.

## 2018-08-07 ENCOUNTER — OFFICE VISIT (OUTPATIENT)
Dept: MEDICAL GROUP | Age: 73
End: 2018-08-07
Payer: MEDICARE

## 2018-08-07 VITALS
TEMPERATURE: 98.2 F | WEIGHT: 225 LBS | BODY MASS INDEX: 31.5 KG/M2 | HEART RATE: 91 BPM | HEIGHT: 71 IN | SYSTOLIC BLOOD PRESSURE: 122 MMHG | DIASTOLIC BLOOD PRESSURE: 72 MMHG | OXYGEN SATURATION: 93 %

## 2018-08-07 DIAGNOSIS — E78.2 MIXED HYPERLIPIDEMIA: ICD-10-CM

## 2018-08-07 DIAGNOSIS — E66.9 OBESITY (BMI 30-39.9): ICD-10-CM

## 2018-08-07 DIAGNOSIS — K21.00 GASTROESOPHAGEAL REFLUX DISEASE WITH ESOPHAGITIS: ICD-10-CM

## 2018-08-07 DIAGNOSIS — I10 ESSENTIAL HYPERTENSION: ICD-10-CM

## 2018-08-07 DIAGNOSIS — M1A.09X0 IDIOPATHIC CHRONIC GOUT OF MULTIPLE SITES WITHOUT TOPHUS: ICD-10-CM

## 2018-08-07 DIAGNOSIS — R73.01 IFG (IMPAIRED FASTING GLUCOSE): ICD-10-CM

## 2018-08-07 DIAGNOSIS — Z12.11 SCREENING FOR COLORECTAL CANCER: ICD-10-CM

## 2018-08-07 DIAGNOSIS — N52.01 ERECTILE DYSFUNCTION DUE TO ARTERIAL INSUFFICIENCY: ICD-10-CM

## 2018-08-07 DIAGNOSIS — G60.3 IDIOPATHIC PROGRESSIVE NEUROPATHY: ICD-10-CM

## 2018-08-07 DIAGNOSIS — Z12.12 SCREENING FOR COLORECTAL CANCER: ICD-10-CM

## 2018-08-07 DIAGNOSIS — N18.30 CKD (CHRONIC KIDNEY DISEASE) STAGE 3, GFR 30-59 ML/MIN (HCC): ICD-10-CM

## 2018-08-07 PROCEDURE — 99214 OFFICE O/P EST MOD 30 MIN: CPT | Performed by: INTERNAL MEDICINE

## 2018-08-07 ASSESSMENT — ENCOUNTER SYMPTOMS
EYES NEGATIVE: 1
MUSCULOSKELETAL NEGATIVE: 1
CARDIOVASCULAR NEGATIVE: 1
CONSTITUTIONAL NEGATIVE: 1
GASTROINTESTINAL NEGATIVE: 1
PSYCHIATRIC NEGATIVE: 1
NEUROLOGICAL NEGATIVE: 1
RESPIRATORY NEGATIVE: 1

## 2018-08-07 NOTE — PROGRESS NOTES
Subjective:      Corbin Rodríguez is a 73 y.o. male who presents with Follow-Up (lab review)  The patient is here for followup of chronic medical problems listed below. The patient is compliant with medications and having no side effects from them. Denies chest pain, abdominal pain, dyspnea, myalgias, or cough.   Patient Active Problem List    Diagnosis Date Noted   • IFG (impaired fasting glucose) 02/06/2018   • Idiopathic progressive neuropathy 01/15/2018   • Cataract, diabetic (HCC) 02/14/2017   • Bilateral edema of lower extremity 12/19/2016   • Idiopathic chronic gout of multiple sites without tophus 12/04/2015   • Obesity (BMI 30-39.9) 06/09/2015   • CKD (chronic kidney disease) stage 3, GFR 30-59 ml/min 03/09/2015   • Gastroesophageal reflux disease with esophagitis 09/17/2014   • Mixed hyperlipidemia 04/04/2012   • Essential hypertension 04/04/2012   • ED (erectile dysfunction) 04/04/2012     No Known Allergies  Outpatient Medications Prior to Visit   Medication Sig Dispense Refill   • Levomefolate Glucosamine (METHYLFOLATE PO) Take 1,000 mg by mouth every day.     • gabapentin (NEURONTIN) 300 MG Cap Take 1 Cap by mouth 3 times a day. 90 Cap 11   • amlodipine (NORVASC) 5 MG Tab Take 0.5 Tabs by mouth every day. 45 Tab 4   • atorvastatin (LIPITOR) 80 MG tablet Take 1 Tab by mouth every evening. 90 Tab 4   • lisinopril (PRINIVIL) 20 MG Tab Take 1 Tab by mouth every day. 90 Tab 4   • omeprazole (PRILOSEC) 20 MG delayed-release capsule Take 1 Cap by mouth every day. 90 Cap 4   • Cholecalciferol (VITAMIN D) 1000 UNIT CAPS Take 1 Cap by mouth every day. 100 Cap 11   • cyanocobalamin (VITAMIN B12) 1000 MCG TABS Take 1 Tab by mouth every day. 100 Each 11   • aspirin EC (ECOTRIN) 81 MG TBEC Take 81 mg by mouth every day.       No facility-administered medications prior to visit.                HPI    Review of Systems   Constitutional: Negative.    HENT: Negative.    Eyes: Negative.    Respiratory: Negative.   "  Cardiovascular: Negative.    Gastrointestinal: Negative.    Genitourinary: Negative.    Musculoskeletal: Negative.    Skin: Negative.    Neurological: Negative.    Endo/Heme/Allergies: Negative.    Psychiatric/Behavioral: Negative.           Objective:     /72   Pulse 91   Temp 36.8 °C (98.2 °F)   Ht 1.803 m (5' 10.98\")   Wt 102.1 kg (225 lb)   SpO2 93%   BMI 31.40 kg/m²      Physical Exam   Constitutional: He is oriented to person, place, and time. He appears well-developed and well-nourished. No distress.   HENT:   Head: Normocephalic and atraumatic.   Right Ear: External ear normal.   Left Ear: External ear normal.   Nose: Nose normal.   Mouth/Throat: Oropharynx is clear and moist. No oropharyngeal exudate.   Eyes: Pupils are equal, round, and reactive to light. Conjunctivae and EOM are normal. Right eye exhibits no discharge. Left eye exhibits no discharge. No scleral icterus.   Neck: Normal range of motion. Neck supple. No JVD present. No tracheal deviation present. No thyromegaly present.   Cardiovascular: Normal rate, regular rhythm, normal heart sounds and intact distal pulses.  Exam reveals no gallop and no friction rub.    No murmur heard.  Pulmonary/Chest: Effort normal and breath sounds normal. No stridor. No respiratory distress. He has no wheezes. He has no rales. He exhibits no tenderness.   Abdominal: Soft. Bowel sounds are normal. He exhibits no distension and no mass. There is no tenderness. There is no rebound and no guarding.   Musculoskeletal: Normal range of motion. He exhibits no edema or tenderness.   Lymphadenopathy:     He has no cervical adenopathy.   Neurological: He is alert and oriented to person, place, and time. He has normal reflexes. He displays normal reflexes. He exhibits normal muscle tone. Coordination normal.   Skin: Skin is warm and dry. No rash noted. He is not diaphoretic. No erythema. No pallor.   Psychiatric: He has a normal mood and affect. His behavior is " normal. Judgment and thought content normal.     Hospital Outpatient Visit on 08/03/2018   Component Date Value   • Sodium 08/03/2018 141    • Potassium 08/03/2018 4.4    • Chloride 08/03/2018 105    • Co2 08/03/2018 25    • Anion Gap 08/03/2018 11.0    • Glucose 08/03/2018 119*   • Bun 08/03/2018 12    • Creatinine 08/03/2018 1.13    • Calcium 08/03/2018 9.2    • AST(SGOT) 08/03/2018 47*   • ALT(SGPT) 08/03/2018 62*   • Alkaline Phosphatase 08/03/2018 70    • Total Bilirubin 08/03/2018 0.6    • Albumin 08/03/2018 4.5    • Total Protein 08/03/2018 6.8    • Globulin 08/03/2018 2.3    • A-G Ratio 08/03/2018 2.0    • Cholesterol,Tot 08/03/2018 112    • Triglycerides 08/03/2018 101    • HDL 08/03/2018 52    • LDL 08/03/2018 40    • WBC 08/03/2018 5.2    • RBC 08/03/2018 4.78    • Hemoglobin 08/03/2018 15.3    • Hematocrit 08/03/2018 43.9    • MCV 08/03/2018 91.8    • MCH 08/03/2018 32.0    • MCHC 08/03/2018 34.9    • RDW 08/03/2018 49.0    • Platelet Count 08/03/2018 154*   • MPV 08/03/2018 10.7    • Neutrophils-Polys 08/03/2018 51.10    • Lymphocytes 08/03/2018 35.30    • Monocytes 08/03/2018 11.40    • Eosinophils 08/03/2018 1.60    • Basophils 08/03/2018 0.40    • Immature Granulocytes 08/03/2018 0.20    • Nucleated RBC 08/03/2018 0.00    • Neutrophils (Absolute) 08/03/2018 2.64    • Lymphs (Absolute) 08/03/2018 1.82    • Monos (Absolute) 08/03/2018 0.59    • Eos (Absolute) 08/03/2018 0.08    • Baso (Absolute) 08/03/2018 0.02    • Immature Granulocytes (a* 08/03/2018 0.01    • NRBC (Absolute) 08/03/2018 0.00    • Glycohemoglobin 08/03/2018 6.4*   • Est Avg Glucose 08/03/2018 137    • Uric Acid 08/03/2018 7.4    • GFR If  08/03/2018 >60    • GFR If Non  [employer]* 08/03/2018 >60       Lab Results   Component Value Date/Time    HBA1C 6.4 (H) 08/03/2018 10:33 AM    HBA1C 5.7 (H) 01/16/2018 10:15 AM     Lab Results   Component Value Date/Time    SODIUM 141 08/03/2018 10:33 AM    POTASSIUM 4.4  08/03/2018 10:33 AM    CHLORIDE 105 08/03/2018 10:33 AM    CO2 25 08/03/2018 10:33 AM    GLUCOSE 119 (H) 08/03/2018 10:33 AM    BUN 12 08/03/2018 10:33 AM    CREATININE 1.13 08/03/2018 10:33 AM    BUNCREATRAT 8 (L) 11/30/2015 02:32 PM    ALKPHOSPHAT 70 08/03/2018 10:33 AM    ASTSGOT 47 (H) 08/03/2018 10:33 AM    ALTSGPT 62 (H) 08/03/2018 10:33 AM    TBILIRUBIN 0.6 08/03/2018 10:33 AM     No results found for: INR  Lab Results   Component Value Date/Time    CHOLSTRLTOT 112 08/03/2018 10:33 AM    LDL 40 08/03/2018 10:33 AM    HDL 52 08/03/2018 10:33 AM    TRIGLYCERIDE 101 08/03/2018 10:33 AM       Lab Results   Component Value Date/Time    TESTOSTERONE 553 09/05/2013 09:07 AM     No results found for: TSH  Lab Results   Component Value Date/Time    FREET4 0.94 09/08/2014 08:43 AM    FREET4 0.76 02/10/2014 08:57 AM     Lab Results   Component Value Date/Time    URICACID 7.4 08/03/2018 10:33 AM     No components found for: VITB12  Lab Results   Component Value Date/Time    25HYDROXY 44 12/15/2016 08:04 AM    25HYDROXY 49 06/16/2016 09:35 AM                 Assessment/Plan:     1. IFG (impaired fasting glucose)     diet/exercise/lose 15 lbs.; patient counseled    - HEMOGLOBIN A1C; Future    2. Mixed hyperlipidemia   Under good control. Continue same regimen.    - COMP METABOLIC PANEL; Future  - LIPID PROFILE; Future  - CBC WITH DIFFERENTIAL; Future    3. Essential hypertension      Under good control. Continue same regimen.  4. Erectile dysfunction due to arterial insufficiency    Under good control. Continue same regimen.  5. Obesity (BMI 30-39.9)    diet/exercise/lose 15 lbs.; patient counseled    - Patient identified as having weight management issue.  Appropriate orders and counseling given.    6. Gastroesophageal reflux disease with esophagitis      Under good control. Continue same regimen.  7. CKD (chronic kidney disease) stage 3, GFR 30-59 ml/min       Under good control. Continue same regimen.    8. Idiopathic  chronic gout of multiple sites without tophus     Under good control. Continue same regimen.    9. Idiopathic progressive neuropathy      Under good control. Continue same regimen.    10. Screening for colorectal cancer     - REFERRAL TO GI FOR COLONOSCOPY    30 minute face-to-face encounter took place today.  More than half of this time was spent in the coordination of care of the above problems, as well as counseling.

## 2018-08-24 DIAGNOSIS — I10 ESSENTIAL HYPERTENSION: ICD-10-CM

## 2018-08-24 DIAGNOSIS — E78.2 MIXED HYPERLIPIDEMIA: ICD-10-CM

## 2018-08-27 RX ORDER — ATORVASTATIN CALCIUM 80 MG/1
80 TABLET, FILM COATED ORAL EVERY EVENING
Qty: 90 TAB | Refills: 4 | Status: ON HOLD | OUTPATIENT
Start: 2018-08-27 | End: 2019-06-13

## 2018-08-27 RX ORDER — LISINOPRIL 20 MG/1
20 TABLET ORAL DAILY
Qty: 90 TAB | Refills: 0 | Status: SHIPPED | OUTPATIENT
Start: 2018-08-27 | End: 2018-11-23 | Stop reason: SDUPTHER

## 2018-08-27 RX ORDER — AMLODIPINE BESYLATE 5 MG/1
2.5 TABLET ORAL DAILY
Qty: 45 TAB | Refills: 4 | Status: ON HOLD | OUTPATIENT
Start: 2018-08-27 | End: 2019-06-13

## 2018-11-26 ENCOUNTER — PATIENT OUTREACH (OUTPATIENT)
Dept: HEALTH INFORMATION MANAGEMENT | Facility: OTHER | Age: 73
End: 2018-11-26

## 2018-12-27 ENCOUNTER — HOSPITAL ENCOUNTER (OUTPATIENT)
Dept: LAB | Facility: MEDICAL CENTER | Age: 73
End: 2018-12-27
Attending: INTERNAL MEDICINE
Payer: MEDICARE

## 2018-12-27 DIAGNOSIS — R73.01 IFG (IMPAIRED FASTING GLUCOSE): ICD-10-CM

## 2018-12-27 DIAGNOSIS — E78.2 MIXED HYPERLIPIDEMIA: ICD-10-CM

## 2018-12-27 LAB
ALBUMIN SERPL BCP-MCNC: 4 G/DL (ref 3.2–4.9)
ALBUMIN/GLOB SERPL: 1.5 G/DL
ALP SERPL-CCNC: 67 U/L (ref 30–99)
ALT SERPL-CCNC: 61 U/L (ref 2–50)
ANION GAP SERPL CALC-SCNC: 10 MMOL/L (ref 0–11.9)
AST SERPL-CCNC: 42 U/L (ref 12–45)
BASOPHILS # BLD AUTO: 0.3 % (ref 0–1.8)
BASOPHILS # BLD: 0.01 K/UL (ref 0–0.12)
BILIRUB SERPL-MCNC: 0.5 MG/DL (ref 0.1–1.5)
BUN SERPL-MCNC: 13 MG/DL (ref 8–22)
CALCIUM SERPL-MCNC: 9.1 MG/DL (ref 8.5–10.5)
CHLORIDE SERPL-SCNC: 106 MMOL/L (ref 96–112)
CHOLEST SERPL-MCNC: 127 MG/DL (ref 100–199)
CO2 SERPL-SCNC: 23 MMOL/L (ref 20–33)
CREAT SERPL-MCNC: 1.17 MG/DL (ref 0.5–1.4)
EOSINOPHIL # BLD AUTO: 0.01 K/UL (ref 0–0.51)
EOSINOPHIL NFR BLD: 0.3 % (ref 0–6.9)
ERYTHROCYTE [DISTWIDTH] IN BLOOD BY AUTOMATED COUNT: 48.5 FL (ref 35.9–50)
EST. AVERAGE GLUCOSE BLD GHB EST-MCNC: 128 MG/DL
FASTING STATUS PATIENT QL REPORTED: NORMAL
GLOBULIN SER CALC-MCNC: 2.7 G/DL (ref 1.9–3.5)
GLUCOSE SERPL-MCNC: 119 MG/DL (ref 65–99)
HBA1C MFR BLD: 6.1 % (ref 0–5.6)
HCT VFR BLD AUTO: 44.1 % (ref 42–52)
HDLC SERPL-MCNC: 56 MG/DL
HGB BLD-MCNC: 14.9 G/DL (ref 14–18)
IMM GRANULOCYTES # BLD AUTO: 0.01 K/UL (ref 0–0.11)
IMM GRANULOCYTES NFR BLD AUTO: 0.3 % (ref 0–0.9)
LDLC SERPL CALC-MCNC: 45 MG/DL
LYMPHOCYTES # BLD AUTO: 1.42 K/UL (ref 1–4.8)
LYMPHOCYTES NFR BLD: 36.8 % (ref 22–41)
MCH RBC QN AUTO: 34.4 PG (ref 27–33)
MCHC RBC AUTO-ENTMCNC: 33.8 G/DL (ref 33.7–35.3)
MCV RBC AUTO: 101.8 FL (ref 81.4–97.8)
MONOCYTES # BLD AUTO: 0.4 K/UL (ref 0–0.85)
MONOCYTES NFR BLD AUTO: 10.4 % (ref 0–13.4)
NEUTROPHILS # BLD AUTO: 2.01 K/UL (ref 1.82–7.42)
NEUTROPHILS NFR BLD: 51.9 % (ref 44–72)
NRBC # BLD AUTO: 0 K/UL
NRBC BLD-RTO: 0 /100 WBC
PLATELET # BLD AUTO: 128 K/UL (ref 164–446)
PMV BLD AUTO: 9.9 FL (ref 9–12.9)
POTASSIUM SERPL-SCNC: 4.3 MMOL/L (ref 3.6–5.5)
PROT SERPL-MCNC: 6.7 G/DL (ref 6–8.2)
RBC # BLD AUTO: 4.33 M/UL (ref 4.7–6.1)
SODIUM SERPL-SCNC: 139 MMOL/L (ref 135–145)
TRIGL SERPL-MCNC: 132 MG/DL (ref 0–149)
WBC # BLD AUTO: 3.9 K/UL (ref 4.8–10.8)

## 2018-12-27 PROCEDURE — 80061 LIPID PANEL: CPT

## 2018-12-27 PROCEDURE — 80053 COMPREHEN METABOLIC PANEL: CPT

## 2018-12-27 PROCEDURE — 83036 HEMOGLOBIN GLYCOSYLATED A1C: CPT

## 2018-12-27 PROCEDURE — 36415 COLL VENOUS BLD VENIPUNCTURE: CPT

## 2018-12-27 PROCEDURE — 85025 COMPLETE CBC W/AUTO DIFF WBC: CPT

## 2019-04-22 ENCOUNTER — PATIENT OUTREACH (OUTPATIENT)
Dept: HEALTH INFORMATION MANAGEMENT | Facility: OTHER | Age: 74
End: 2019-04-22

## 2019-04-22 NOTE — PROGRESS NOTES
Outcome: Left voicemail/ message    Please transfer to [facility]  [phone] when patient returns call.     WebIZ Checked & Epic Updated:  yes     [facility] Verified: yes     Attempt # 1

## 2019-06-12 ENCOUNTER — HOSPITAL ENCOUNTER (OUTPATIENT)
Facility: MEDICAL CENTER | Age: 74
End: 2019-06-12
Admitting: HOSPITALIST
Payer: MEDICARE

## 2019-06-12 ENCOUNTER — HOSPITAL ENCOUNTER (INPATIENT)
Facility: MEDICAL CENTER | Age: 74
LOS: 4 days | DRG: 193 | End: 2019-06-16
Attending: HOSPITALIST | Admitting: HOSPITALIST
Payer: COMMERCIAL

## 2019-06-12 PROCEDURE — 770020 HCHG ROOM/CARE - TELE (206)

## 2019-06-12 PROCEDURE — 99223 1ST HOSP IP/OBS HIGH 75: CPT | Mod: AI | Performed by: HOSPITALIST

## 2019-06-12 RX ORDER — BISACODYL 10 MG
10 SUPPOSITORY, RECTAL RECTAL
Status: DISCONTINUED | OUTPATIENT
Start: 2019-06-12 | End: 2019-06-16 | Stop reason: HOSPADM

## 2019-06-12 RX ORDER — GABAPENTIN 300 MG/1
300 CAPSULE ORAL 3 TIMES DAILY
Status: DISCONTINUED | OUTPATIENT
Start: 2019-06-13 | End: 2019-06-16 | Stop reason: HOSPADM

## 2019-06-12 RX ORDER — ONDANSETRON 4 MG/1
4 TABLET, ORALLY DISINTEGRATING ORAL EVERY 4 HOURS PRN
Status: DISCONTINUED | OUTPATIENT
Start: 2019-06-12 | End: 2019-06-16 | Stop reason: HOSPADM

## 2019-06-12 RX ORDER — CHOLECALCIFEROL (VITAMIN D3) 125 MCG
1000 CAPSULE ORAL DAILY
Status: DISCONTINUED | OUTPATIENT
Start: 2019-06-13 | End: 2019-06-16 | Stop reason: HOSPADM

## 2019-06-12 RX ORDER — AMOXICILLIN 250 MG
2 CAPSULE ORAL 2 TIMES DAILY
Status: DISCONTINUED | OUTPATIENT
Start: 2019-06-13 | End: 2019-06-16 | Stop reason: HOSPADM

## 2019-06-12 RX ORDER — OMEPRAZOLE 20 MG/1
20 CAPSULE, DELAYED RELEASE ORAL DAILY
Status: DISCONTINUED | OUTPATIENT
Start: 2019-06-13 | End: 2019-06-16 | Stop reason: HOSPADM

## 2019-06-12 RX ORDER — LISINOPRIL 20 MG/1
20 TABLET ORAL DAILY
Status: DISCONTINUED | OUTPATIENT
Start: 2019-06-13 | End: 2019-06-13

## 2019-06-12 RX ORDER — SODIUM CHLORIDE 9 MG/ML
INJECTION, SOLUTION INTRAVENOUS CONTINUOUS
Status: DISCONTINUED | OUTPATIENT
Start: 2019-06-13 | End: 2019-06-13

## 2019-06-12 RX ORDER — ACETAMINOPHEN 325 MG/1
650 TABLET ORAL EVERY 6 HOURS PRN
Status: DISCONTINUED | OUTPATIENT
Start: 2019-06-12 | End: 2019-06-16 | Stop reason: HOSPADM

## 2019-06-12 RX ORDER — POLYETHYLENE GLYCOL 3350 17 G/17G
1 POWDER, FOR SOLUTION ORAL
Status: DISCONTINUED | OUTPATIENT
Start: 2019-06-12 | End: 2019-06-16 | Stop reason: HOSPADM

## 2019-06-12 RX ORDER — ONDANSETRON 2 MG/ML
4 INJECTION INTRAMUSCULAR; INTRAVENOUS EVERY 4 HOURS PRN
Status: DISCONTINUED | OUTPATIENT
Start: 2019-06-12 | End: 2019-06-16 | Stop reason: HOSPADM

## 2019-06-12 RX ORDER — AMLODIPINE BESYLATE 5 MG/1
2.5 TABLET ORAL DAILY
Status: DISCONTINUED | OUTPATIENT
Start: 2019-06-13 | End: 2019-06-13

## 2019-06-12 RX ORDER — ATORVASTATIN CALCIUM 40 MG/1
80 TABLET, FILM COATED ORAL EVERY EVENING
Status: DISCONTINUED | OUTPATIENT
Start: 2019-06-13 | End: 2019-06-16 | Stop reason: HOSPADM

## 2019-06-12 ASSESSMENT — COGNITIVE AND FUNCTIONAL STATUS - GENERAL
DAILY ACTIVITIY SCORE: 24
SUGGESTED CMS G CODE MODIFIER MOBILITY: CH
MOBILITY SCORE: 24
SUGGESTED CMS G CODE MODIFIER DAILY ACTIVITY: CH

## 2019-06-12 ASSESSMENT — PATIENT HEALTH QUESTIONNAIRE - PHQ9
2. FEELING DOWN, DEPRESSED, IRRITABLE, OR HOPELESS: NOT AT ALL
SUM OF ALL RESPONSES TO PHQ9 QUESTIONS 1 AND 2: 0
1. LITTLE INTEREST OR PLEASURE IN DOING THINGS: NOT AT ALL

## 2019-06-12 ASSESSMENT — LIFESTYLE VARIABLES
EVER FELT BAD OR GUILTY ABOUT YOUR DRINKING: YES
TOTAL SCORE: 3
TOTAL SCORE: 3
EVER HAD A DRINK FIRST THING IN THE MORNING TO STEADY YOUR NERVES TO GET RID OF A HANGOVER: NO
DOES PATIENT WANT TO STOP DRINKING: NO
TOTAL SCORE: 3
ALCOHOL_USE: YES
AVERAGE NUMBER OF DAYS PER WEEK YOU HAVE A DRINK CONTAINING ALCOHOL: 7
HAVE PEOPLE ANNOYED YOU BY CRITICIZING YOUR DRINKING: YES
CONSUMPTION TOTAL: POSITIVE
HAVE YOU EVER FELT YOU SHOULD CUT DOWN ON YOUR DRINKING: YES
EVER_SMOKED: YES
ON A TYPICAL DAY WHEN YOU DRINK ALCOHOL HOW MANY DRINKS DO YOU HAVE: 5
HOW MANY TIMES IN THE PAST YEAR HAVE YOU HAD 5 OR MORE DRINKS IN A DAY: 265

## 2019-06-12 NOTE — PROGRESS NOTES
Direct admit from [facility], referred by PABLO Zhang. For Pneumonia. Admitting MD is Dr. Bethany FLORES. Upon patient's arrival release signed and held orders, page admit hospitalist on call for orders.

## 2019-06-12 NOTE — PROGRESS NOTES
74-year-old male came to the hospital hypoxic and febrile for the past 3 days after [place].  X-ray found right lower lobe pneumonia.  Started on Antibiotics.  [facility]

## 2019-06-13 ENCOUNTER — APPOINTMENT (OUTPATIENT)
Dept: RADIOLOGY | Facility: MEDICAL CENTER | Age: 74
DRG: 193 | End: 2019-06-13
Attending: HOSPITALIST
Payer: COMMERCIAL

## 2019-06-13 PROBLEM — J96.01 ACUTE RESPIRATORY FAILURE WITH HYPOXIA (HCC): Status: ACTIVE | Noted: 2019-06-13

## 2019-06-13 PROBLEM — J18.9 COMMUNITY ACQUIRED PNEUMONIA OF RIGHT LOWER LOBE OF LUNG: Status: ACTIVE | Noted: 2019-06-13

## 2019-06-13 PROBLEM — G62.9 NEUROPATHY: Status: ACTIVE | Noted: 2018-01-15

## 2019-06-13 LAB
ALBUMIN SERPL BCP-MCNC: 3.2 G/DL (ref 3.2–4.9)
ALBUMIN/GLOB SERPL: 1 G/DL
ALP SERPL-CCNC: 45 U/L (ref 30–99)
ALT SERPL-CCNC: 20 U/L (ref 2–50)
ANION GAP SERPL CALC-SCNC: 10 MMOL/L (ref 0–11.9)
ANION GAP SERPL CALC-SCNC: 7 MMOL/L (ref 0–11.9)
AST SERPL-CCNC: 18 U/L (ref 12–45)
BASOPHILS # BLD AUTO: 0.2 % (ref 0–1.8)
BASOPHILS # BLD AUTO: 0.2 % (ref 0–1.8)
BASOPHILS # BLD: 0.01 K/UL (ref 0–0.12)
BASOPHILS # BLD: 0.01 K/UL (ref 0–0.12)
BILIRUB SERPL-MCNC: 1.1 MG/DL (ref 0.1–1.5)
BUN SERPL-MCNC: 13 MG/DL (ref 8–22)
BUN SERPL-MCNC: 19 MG/DL (ref 8–22)
CALCIUM SERPL-MCNC: 7.8 MG/DL (ref 8.4–10.2)
CALCIUM SERPL-MCNC: 7.9 MG/DL (ref 8.4–10.2)
CHLORIDE SERPL-SCNC: 102 MMOL/L (ref 96–112)
CHLORIDE SERPL-SCNC: 103 MMOL/L (ref 96–112)
CHOLEST SERPL-MCNC: 68 MG/DL (ref 100–199)
CO2 SERPL-SCNC: 23 MMOL/L (ref 20–33)
CO2 SERPL-SCNC: 24 MMOL/L (ref 20–33)
CREAT SERPL-MCNC: 1.24 MG/DL (ref 0.5–1.4)
CREAT SERPL-MCNC: 1.49 MG/DL (ref 0.5–1.4)
EOSINOPHIL # BLD AUTO: 0.04 K/UL (ref 0–0.51)
EOSINOPHIL # BLD AUTO: 0.06 K/UL (ref 0–0.51)
EOSINOPHIL NFR BLD: 0.7 % (ref 0–6.9)
EOSINOPHIL NFR BLD: 1.2 % (ref 0–6.9)
ERYTHROCYTE [DISTWIDTH] IN BLOOD BY AUTOMATED COUNT: 51.2 FL (ref 35.9–50)
ERYTHROCYTE [DISTWIDTH] IN BLOOD BY AUTOMATED COUNT: 51.3 FL (ref 35.9–50)
GLOBULIN SER CALC-MCNC: 3.1 G/DL (ref 1.9–3.5)
GLUCOSE BLD-MCNC: 114 MG/DL (ref 65–99)
GLUCOSE BLD-MCNC: 118 MG/DL (ref 65–99)
GLUCOSE BLD-MCNC: 139 MG/DL (ref 65–99)
GLUCOSE SERPL-MCNC: 118 MG/DL (ref 65–99)
GLUCOSE SERPL-MCNC: 146 MG/DL (ref 65–99)
HCT VFR BLD AUTO: 39.9 % (ref 42–52)
HCT VFR BLD AUTO: 40.6 % (ref 42–52)
HDLC SERPL-MCNC: 34 MG/DL
HGB BLD-MCNC: 13.2 G/DL (ref 14–18)
HGB BLD-MCNC: 13.6 G/DL (ref 14–18)
IMM GRANULOCYTES # BLD AUTO: 0.01 K/UL (ref 0–0.11)
IMM GRANULOCYTES # BLD AUTO: 0.02 K/UL (ref 0–0.11)
IMM GRANULOCYTES NFR BLD AUTO: 0.2 % (ref 0–0.9)
IMM GRANULOCYTES NFR BLD AUTO: 0.3 % (ref 0–0.9)
LDLC SERPL CALC-MCNC: 24 MG/DL
LYMPHOCYTES # BLD AUTO: 0.63 K/UL (ref 1–4.8)
LYMPHOCYTES # BLD AUTO: 0.79 K/UL (ref 1–4.8)
LYMPHOCYTES NFR BLD: 11 % (ref 22–41)
LYMPHOCYTES NFR BLD: 15.2 % (ref 22–41)
MAGNESIUM SERPL-MCNC: 1.8 MG/DL (ref 1.5–2.5)
MCH RBC QN AUTO: 31.5 PG (ref 27–33)
MCH RBC QN AUTO: 32 PG (ref 27–33)
MCHC RBC AUTO-ENTMCNC: 33.1 G/DL (ref 33.7–35.3)
MCHC RBC AUTO-ENTMCNC: 33.5 G/DL (ref 33.7–35.3)
MCV RBC AUTO: 95.2 FL (ref 81.4–97.8)
MCV RBC AUTO: 95.5 FL (ref 81.4–97.8)
MONOCYTES # BLD AUTO: 0.43 K/UL (ref 0–0.85)
MONOCYTES # BLD AUTO: 0.55 K/UL (ref 0–0.85)
MONOCYTES NFR BLD AUTO: 8.3 % (ref 0–13.4)
MONOCYTES NFR BLD AUTO: 9.6 % (ref 0–13.4)
NEUTROPHILS # BLD AUTO: 3.9 K/UL (ref 1.82–7.42)
NEUTROPHILS # BLD AUTO: 4.47 K/UL (ref 1.82–7.42)
NEUTROPHILS NFR BLD: 74.9 % (ref 44–72)
NEUTROPHILS NFR BLD: 78.2 % (ref 44–72)
NRBC # BLD AUTO: 0 K/UL
NRBC # BLD AUTO: 0 K/UL
NRBC BLD-RTO: 0 /100 WBC
NRBC BLD-RTO: 0 /100 WBC
PHOSPHATE SERPL-MCNC: 2.1 MG/DL (ref 2.5–4.5)
PLATELET # BLD AUTO: 125 K/UL (ref 164–446)
PLATELET # BLD AUTO: 132 K/UL (ref 164–446)
PMV BLD AUTO: 10.2 FL (ref 9–12.9)
PMV BLD AUTO: 9.9 FL (ref 9–12.9)
POTASSIUM SERPL-SCNC: 3.8 MMOL/L (ref 3.6–5.5)
POTASSIUM SERPL-SCNC: 3.9 MMOL/L (ref 3.6–5.5)
PROCALCITONIN SERPL-MCNC: 1.96 NG/ML
PROT SERPL-MCNC: 6.3 G/DL (ref 6–8.2)
RBC # BLD AUTO: 4.19 M/UL (ref 4.7–6.1)
RBC # BLD AUTO: 4.25 M/UL (ref 4.7–6.1)
SODIUM SERPL-SCNC: 134 MMOL/L (ref 135–145)
SODIUM SERPL-SCNC: 135 MMOL/L (ref 135–145)
TRIGL SERPL-MCNC: 51 MG/DL (ref 0–149)
WBC # BLD AUTO: 5.2 K/UL (ref 4.8–10.8)
WBC # BLD AUTO: 5.7 K/UL (ref 4.8–10.8)

## 2019-06-13 PROCEDURE — A9270 NON-COVERED ITEM OR SERVICE: HCPCS | Performed by: HOSPITALIST

## 2019-06-13 PROCEDURE — 770020 HCHG ROOM/CARE - TELE (206)

## 2019-06-13 PROCEDURE — 82962 GLUCOSE BLOOD TEST: CPT

## 2019-06-13 PROCEDURE — 93010 ELECTROCARDIOGRAM REPORT: CPT | Performed by: INTERNAL MEDICINE

## 2019-06-13 PROCEDURE — 94760 N-INVAS EAR/PLS OXIMETRY 1: CPT

## 2019-06-13 PROCEDURE — 87040 BLOOD CULTURE FOR BACTERIA: CPT | Mod: 91

## 2019-06-13 PROCEDURE — A9270 NON-COVERED ITEM OR SERVICE: HCPCS | Performed by: INTERNAL MEDICINE

## 2019-06-13 PROCEDURE — 700102 HCHG RX REV CODE 250 W/ 637 OVERRIDE(OP): Performed by: HOSPITALIST

## 2019-06-13 PROCEDURE — 99232 SBSQ HOSP IP/OBS MODERATE 35: CPT | Performed by: INTERNAL MEDICINE

## 2019-06-13 PROCEDURE — 700111 HCHG RX REV CODE 636 W/ 250 OVERRIDE (IP): Performed by: HOSPITALIST

## 2019-06-13 PROCEDURE — 80053 COMPREHEN METABOLIC PANEL: CPT

## 2019-06-13 PROCEDURE — 85025 COMPLETE CBC W/AUTO DIFF WBC: CPT

## 2019-06-13 PROCEDURE — 80048 BASIC METABOLIC PNL TOTAL CA: CPT

## 2019-06-13 PROCEDURE — 84100 ASSAY OF PHOSPHORUS: CPT

## 2019-06-13 PROCEDURE — 86713 LEGIONELLA ANTIBODY: CPT

## 2019-06-13 PROCEDURE — 84145 PROCALCITONIN (PCT): CPT

## 2019-06-13 PROCEDURE — 700102 HCHG RX REV CODE 250 W/ 637 OVERRIDE(OP): Performed by: INTERNAL MEDICINE

## 2019-06-13 PROCEDURE — 80061 LIPID PANEL: CPT

## 2019-06-13 PROCEDURE — 93005 ELECTROCARDIOGRAM TRACING: CPT | Performed by: INTERNAL MEDICINE

## 2019-06-13 PROCEDURE — 87205 SMEAR GRAM STAIN: CPT

## 2019-06-13 PROCEDURE — 700105 HCHG RX REV CODE 258: Performed by: HOSPITALIST

## 2019-06-13 PROCEDURE — 71045 X-RAY EXAM CHEST 1 VIEW: CPT

## 2019-06-13 PROCEDURE — 83735 ASSAY OF MAGNESIUM: CPT

## 2019-06-13 RX ORDER — ALBUTEROL SULFATE 90 UG/1
2 AEROSOL, METERED RESPIRATORY (INHALATION)
Status: DISCONTINUED | OUTPATIENT
Start: 2019-06-13 | End: 2019-06-13

## 2019-06-13 RX ORDER — ATORVASTATIN CALCIUM 80 MG/1
80 TABLET, FILM COATED ORAL NIGHTLY
COMMUNITY

## 2019-06-13 RX ORDER — AZITHROMYCIN 250 MG/1
250 TABLET, FILM COATED ORAL DAILY
Status: DISCONTINUED | OUTPATIENT
Start: 2019-06-13 | End: 2019-06-16 | Stop reason: HOSPADM

## 2019-06-13 RX ORDER — GABAPENTIN 300 MG/1
300 CAPSULE ORAL 4 TIMES DAILY
COMMUNITY

## 2019-06-13 RX ORDER — GUAIFENESIN 600 MG/1
600 TABLET, EXTENDED RELEASE ORAL EVERY 12 HOURS
Status: DISCONTINUED | OUTPATIENT
Start: 2019-06-13 | End: 2019-06-16 | Stop reason: HOSPADM

## 2019-06-13 RX ORDER — FUROSEMIDE 40 MG/1
40 TABLET ORAL DAILY
Status: ON HOLD | COMMUNITY
End: 2019-06-13

## 2019-06-13 RX ORDER — LISINOPRIL 20 MG/1
20 TABLET ORAL DAILY
COMMUNITY

## 2019-06-13 RX ORDER — AMLODIPINE BESYLATE 2.5 MG/1
2.5 TABLET ORAL DAILY
Status: ON HOLD | COMMUNITY
End: 2019-06-16

## 2019-06-13 RX ORDER — ALBUTEROL SULFATE 90 UG/1
2 AEROSOL, METERED RESPIRATORY (INHALATION) EVERY 4 HOURS
Status: DISCONTINUED | OUTPATIENT
Start: 2019-06-13 | End: 2019-06-14

## 2019-06-13 RX ADMIN — ALBUTEROL SULFATE 2 PUFF: 90 AEROSOL, METERED RESPIRATORY (INHALATION) at 21:32

## 2019-06-13 RX ADMIN — ATORVASTATIN CALCIUM 80 MG: 40 TABLET, FILM COATED ORAL at 17:14

## 2019-06-13 RX ADMIN — VITAMIN D, TAB 1000IU (100/BT) 1000 UNITS: 25 TAB at 06:15

## 2019-06-13 RX ADMIN — LISINOPRIL 20 MG: 20 TABLET ORAL at 06:16

## 2019-06-13 RX ADMIN — GABAPENTIN 300 MG: 300 CAPSULE ORAL at 09:15

## 2019-06-13 RX ADMIN — AMLODIPINE BESYLATE 2.5 MG: 5 TABLET ORAL at 06:16

## 2019-06-13 RX ADMIN — ALBUTEROL SULFATE 2 PUFF: 90 AEROSOL, METERED RESPIRATORY (INHALATION) at 16:37

## 2019-06-13 RX ADMIN — DEXTROSE MONOHYDRATE 500 MG: 50 INJECTION, SOLUTION INTRAVENOUS at 00:15

## 2019-06-13 RX ADMIN — CYANOCOBALAMIN TAB 500 MCG 1000 MCG: 500 TAB at 06:15

## 2019-06-13 RX ADMIN — GABAPENTIN 300 MG: 300 CAPSULE ORAL at 00:30

## 2019-06-13 RX ADMIN — ASPIRIN 81 MG: 81 TABLET, COATED ORAL at 06:15

## 2019-06-13 RX ADMIN — AZITHROMYCIN 250 MG: 250 TABLET, FILM COATED ORAL at 21:31

## 2019-06-13 RX ADMIN — SODIUM CHLORIDE: 9 INJECTION, SOLUTION INTRAVENOUS at 00:30

## 2019-06-13 RX ADMIN — ENOXAPARIN SODIUM 40 MG: 100 INJECTION SUBCUTANEOUS at 06:16

## 2019-06-13 RX ADMIN — GABAPENTIN 300 MG: 300 CAPSULE ORAL at 17:14

## 2019-06-13 RX ADMIN — ACETAMINOPHEN 650 MG: 325 TABLET, FILM COATED ORAL at 21:42

## 2019-06-13 RX ADMIN — GUAIFENESIN 600 MG: 600 TABLET, EXTENDED RELEASE ORAL at 17:14

## 2019-06-13 RX ADMIN — ALBUTEROL SULFATE 2 PUFF: 90 AEROSOL, METERED RESPIRATORY (INHALATION) at 10:43

## 2019-06-13 RX ADMIN — CEFTRIAXONE SODIUM 2 G: 2 INJECTION, POWDER, FOR SOLUTION INTRAMUSCULAR; INTRAVENOUS at 00:40

## 2019-06-13 RX ADMIN — GUAIFENESIN 600 MG: 600 TABLET, EXTENDED RELEASE ORAL at 06:15

## 2019-06-13 ASSESSMENT — ENCOUNTER SYMPTOMS
TREMORS: 0
CONSTIPATION: 0
WEAKNESS: 1
DOUBLE VISION: 0
SHORTNESS OF BREATH: 1
SPUTUM PRODUCTION: 1
BLURRED VISION: 0
HEARTBURN: 0
PND: 0
DIARRHEA: 0
HEMOPTYSIS: 0
HALLUCINATIONS: 0
ORTHOPNEA: 0
SORE THROAT: 1
CLAUDICATION: 0
CHILLS: 1
VOMITING: 0
TINGLING: 0
POLYDIPSIA: 0
DIAPHORESIS: 0
PHOTOPHOBIA: 0
HEADACHES: 0
MYALGIAS: 0
BACK PAIN: 0
EYE PAIN: 0
STRIDOR: 0
DEPRESSION: 0
COUGH: 1
SINUS PAIN: 1
FEVER: 0
NECK PAIN: 0
BRUISES/BLEEDS EASILY: 0
FLANK PAIN: 0
PALPITATIONS: 0
WEAKNESS: 0
WHEEZING: 0
CHILLS: 0
FEVER: 1
FALLS: 0
DIZZINESS: 1
ABDOMINAL PAIN: 0
BLOOD IN STOOL: 0
NAUSEA: 0

## 2019-06-13 ASSESSMENT — LIFESTYLE VARIABLES
SUBSTANCE_ABUSE: 0
EVER_SMOKED: YES

## 2019-06-13 NOTE — DIETARY
Nutrition services: Day 1 of admit.  Corbin Rodríguez is a 74 y.o. male with admitting DX of pneumonia  Consult received for Nutrition admit screen trigger of poor oral intake for 4 or more days.    Assessment:  Height: 182.9 cm (6')  Weight: 106.2 kg (234 lb 2.1 oz)  Body mass index is 31.75 kg/m²., BMI classification: Class 1 Obesity  Diet/Intake: 2 gm Sodium, Diabetic.  PO intake Breakfast 6/13 = 25-50%    Evaluation:   1. PMHx includes HTN, Diabetes, Hyperlipidemia, CKD  2. Meds include Zithromax, Rocephin, Vit B12, SSI  3. Labs include Glucose (6/13) 146, 118  4. Pt reports poor intake since he became ill with cough and fever.  Pt admitted with pneumonia.  Now receiving Antibiotics.  Reviewed menu with pt and he is expecting improved intake with recovery.   5. Current Diabetic, 2 gm Sodium diet appropriate with history of Diabetes, HTN, CKD.    Malnutrition Risk: Pt with poor intake x 5 days, but unable to meet 2 criteria at this time.    Recommendations/Plan:  1. Continue Diabetic, 2 gm Sodium diet.   2. Encourage intake of 50% of meals.  3. Document intake of all PO as % taken in ADL's to provide interdisciplinary communication across all shifts.   4. Monitor weight.  5. Nutrition rep will continue to see patient for ongoing meal and snack preferences.   6. RD to monitor.

## 2019-06-13 NOTE — FLOWSHEET NOTE
06/13/19 1637   Events/Summary/Plan   Events/Summary/Plan Tx given   Non-Invasive Resp Device Site Inspection Completed Intact   Interdisciplinary Plan of Care-Goals (Indications)   Bronchodilator Indications Physical Exam / Hyperinflation / Wheezing (bronchospasm)   Interdisciplinary Plan of Care-Outcomes    Bronchodilator Outcome Diminished Wheezing and Volume of Air Movement Increased   Education   Education Yes - Pt. / Family has been Instructed in use of Respiratory Medications and Adverse Reactions;Yes - Pt. / Family has been Instructed in use of Respiratory Equipment   RT Assessment of Delivered Medications   Evaluation of Medication Delivery Daily Yes-- Pt /Family has been Instructed in use of Respiratory Medications and Adverse Reactions   MDI/DPI Group   #MDI/DPI Given MDI/DPI x 1   Chest Exam   Respiration 18   Pulse 90   Breath Sounds   RUL Breath Sounds Crackles;Expiratory Wheezes   RML Breath Sounds Crackles;Expiratory Wheezes   RLL Breath Sounds Crackles;Expiratory Wheezes   KAY Breath Sounds Crackles;Expiratory Wheezes   LLL Breath Sounds Crackles;Expiratory Wheezes   Oximetry   #Pulse Oximetry (Single Determination) Yes   Oxygen   Pulse Oximetry 93 %   O2 (LPM) 2   O2 Daily Delivery Respiratory  Silicone Nasal Cannula

## 2019-06-13 NOTE — PROGRESS NOTES
Received report from Anurag ETIENNE. Discussed plan of care and safety measures in place. No further needs at this time.

## 2019-06-13 NOTE — ASSESSMENT & PLAN NOTE
-multi focal on CXR and exam, quite noted clinical improvement today  CAP antibiotics ceftriaxone and azithromycin, continue day 4/5?  Lactate is within normal limits  -procal 1.92  Follow blood and sputum cultures-remains Negative thus far  -O2 supplementation, try to wean  -stop IVF's  -add prednisone 40 mg daily X5 days, day 2/5

## 2019-06-13 NOTE — PROGRESS NOTES
Received bedside report from LOWELL Alejandre. Plan of care discussed. Safety precautions in place. Call light and personal belongings within reach. Patient has no needs at this time.

## 2019-06-13 NOTE — PROGRESS NOTES
Telemetry Shift Summary    Rhythm SR  HR Range 80-90  Ectopy Freq PVC and Rare couplet  Measurements 0.18/0.10/0.40        Normal Values  Rhythm SR  HR Range    Measurements 0.12-0.20 / 0.06-0.10  / 0.30-0.52

## 2019-06-13 NOTE — PROGRESS NOTES
2 RN skin check complete with LOWELL Mcdonald.   Devices in place oxygen tubing.  Skin assessed under devices oxygen tubing.  Confirmed pressure ulcers found on n/a.  New potential pressure ulcers noted on n/a.   Patient has skin tear on right shin from closing car door on leg.The following interventions in place remind patient to make frequent turns and ambulate.

## 2019-06-13 NOTE — CARE PLAN
Problem: Safety  Goal: Will remain free from injury  Outcome: PROGRESSING AS EXPECTED  Remind patient to use call light and provide assistance. Bed in low position, bed locked, and appropriate alarms set. Patient wearing non-slip socks. Call light and personal belongings are within reach.    Problem: Knowledge Deficit  Goal: Knowledge of disease process/condition, treatment plan, diagnostic tests, and medications will improve  Outcome: PROGRESSING AS EXPECTED  Encourage patient and family to ask questions and be involved in plan of care. Provide education on treatment plan, diagnostic testing, and medications; have patient and family verbalize understanding.

## 2019-06-13 NOTE — PROGRESS NOTES
Gave bedside report to LOWELL Alejandre. Plan of care discussed. Safety precautions in place. Personal belongings and call light are with in reach. Patient has no additional needs at this time.

## 2019-06-13 NOTE — ASSESSMENT & PLAN NOTE
Patient has been counseled on diet and lifestyle modifications  Recommended outpatient weight management program and bariatric surgery evaluation  Pt educated on the increase of morbidity and mortality associated with excess weight including DM, Heart Disease, HTN, stroke, and sleep apnea.  Pt advised weight loss of 5% through reduced calorie, low carb diet and 150 mins of exercise a week

## 2019-06-13 NOTE — PROGRESS NOTES
Hospital Medicine Daily Progress Note    Date of Service  6/13/2019    Chief Complaint  74 y.o. male admitted 6/12/2019 with CAP    Hospital Course    see below      Interval Problem Update  CAP-no change in oxygen status, remains afebrile, feels slightly better. Patient denies any other new symptoms except he was having a more productive cough.    Consultants/Specialty  none    Code Status  fcfc    Disposition  medical    Review of Systems  Review of Systems   Constitutional: Negative for chills and fever.   Respiratory: Positive for cough, sputum production and shortness of breath.    Cardiovascular: Negative for chest pain and leg swelling.   Gastrointestinal: Negative for abdominal pain, nausea and vomiting.   Genitourinary: Negative for dysuria.   Neurological: Positive for weakness.   All other systems reviewed and are negative.       Physical Exam  Temp:  [36.3 °C (97.4 °F)-37.2 °C (99 °F)] 37.2 °C (99 °F)  Pulse:  [80-85] 85  Resp:  [16-18] 18  BP: ()/(48-68) 94/68  SpO2:  [90 %-93 %] 91 %    Physical Exam   Constitutional: He is oriented to person, place, and time. He appears well-developed and well-nourished. No distress.   HENT:   Head: Normocephalic and atraumatic.   Mouth/Throat: No oropharyngeal exudate.   NC in place   Eyes: Pupils are equal, round, and reactive to light. No scleral icterus.   Neck: Normal range of motion. Neck supple. No thyromegaly present.   Cardiovascular: Normal rate, regular rhythm, normal heart sounds and intact distal pulses.    No murmur heard.  Pulmonary/Chest: Effort normal. No respiratory distress. He has rales.   Rhonci throughout both lungs   Abdominal: Soft. Bowel sounds are normal. He exhibits no distension. There is no tenderness.   Musculoskeletal: Normal range of motion. He exhibits no edema or tenderness.   Neurological: He is alert and oriented to person, place, and time. No cranial nerve deficit.   Skin: Skin is warm and dry. No rash noted.   Psychiatric:  He has a normal mood and affect.   Nursing note and vitals reviewed.      Fluids    Intake/Output Summary (Last 24 hours) at 06/13/19 1418  Last data filed at 06/13/19 0815   Gross per 24 hour   Intake           1077.5 ml   Output                0 ml   Net           1077.5 ml       Laboratory  Recent Labs      06/13/19 0057 06/13/19 0757   WBC  5.7  5.2   RBC  4.25*  4.19*   HEMOGLOBIN  13.6*  13.2*   HEMATOCRIT  40.6*  39.9*   MCV  95.5  95.2   MCH  32.0  31.5   MCHC  33.5*  33.1*   RDW  51.3*  51.2*   PLATELETCT  132*  125*   MPV  10.2  9.9     Recent Labs      06/13/19 0057 06/13/19 0757   SODIUM  135  134*   POTASSIUM  3.9  3.8   CHLORIDE  102  103   CO2  23  24   GLUCOSE  146*  118*   BUN  19  13   CREATININE  1.49*  1.24   CALCIUM  7.9*  7.8*             Recent Labs      06/13/19 0057   TRIGLYCERIDE  51   HDL  34*   LDL  24       Imaging  DX-CHEST-PORTABLE (1 VIEW)   Final Result         Patchy opacities in the right mid, right lower lung and left lower lung, concerning for multifocal pneumonia.           Assessment/Plan  * Community acquired pneumonia of right lower lobe of lung (HCC)- (present on admission)   Assessment & Plan    -multi focal on CXR and exam, with diffuse rhonci  CAP antibiotics ceftriaxone and azithromycin, continue day 2/5?  Lactate is within normal limits  -procal 1.92  Follow blood and sputum cultures-pending at this time  -O2 supplementation, try to wean  -stop IVF's     Acute respiratory failure with hypoxia (HCC)- (present on admission)   Assessment & Plan    Secondary to pneumonia   On 2L, no changes, try to wean     Neuropathy (HCC)- (present on admission)   Assessment & Plan    Cont gabapentin     Bilateral edema of lower extremity- (present on admission)   Assessment & Plan    Needs outpatient sleep study, monitor volume status closely     Obesity (BMI 30-39.9)- (present on admission)   Assessment & Plan    Patient has been counseled on diet and lifestyle  modifications  Recommended outpatient weight management program and bariatric surgery evaluation  Pt educated on the increase of morbidity and mortality associated with excess weight including DM, Heart Disease, HTN, stroke, and sleep apnea.  Pt advised weight loss of 5% through reduced calorie, low carb diet and 150 mins of exercise a week       CKD (chronic kidney disease) stage 3, GFR 30-59 ml/min ([facility])- (present on admission)   Assessment & Plan    Chronic kidney disease stage 3  -stable, cr slightly improved today and near his baseline  ACEI - maintain SBP <140       Essential hypertension- (present on admission)   Assessment & Plan    Cont home meds, well controlled at this time     Mixed hyperlipidemia- (present on admission)   Assessment & Plan    -statin          VTE prophylaxis: lovenox

## 2019-06-13 NOTE — H&P
Hospital Medicine History & Physical Note    Date of Service  6/13/2019    Primary Care Physician  Juliocesar Piedra M.D.    Consultants  None    Code Status  Full    Chief Complaint  Fever    History of Presenting Illness  74 y.o. male who presented 6/12/2019 with a medical history of hypertension, diabetes, hyperlipidemia, comes in with complaints of fever, cough, body aches, generalized weakness for 5 days.  Patient reports that had a fever of 102.  States that he initially had runny nose inferior pain that progressed to  Yellow productive cough  Patient just came back from a cruise from [place] with his wife.  Patient reports no nausea, vomiting, diarrhea, abdominal pain, headache.  Patient denies any urinary symptoms.  Patient was at the [facility] and he was transferred here.      From the [facility].  Sodium of 135, potassium 3.9, chloride 101, bicarb of 23, glucose of 1.7, BUN 16, creatinine 1.4, anion gap of 15, calcium of 8.3, magnesium 1.5, WBC of 8.5, hemoglobin 14.5, neutrophil of 79.9, lactic acid 1.1, HIV was negative.    Patient had an EKG interpreted by me found normal sinus rhythm  Patient had a chest x-ray interpreted by me on right-sided infiltrate    Review of Systems  Review of Systems   Constitutional: Positive for chills, fever and malaise/fatigue. Negative for diaphoresis.   HENT: Positive for congestion, sinus pain and sore throat. Negative for ear discharge, ear pain, hearing loss, nosebleeds and tinnitus.    Eyes: Negative for blurred vision, double vision, photophobia and pain.   Respiratory: Positive for cough, sputum production and shortness of breath. Negative for hemoptysis, wheezing and stridor.    Cardiovascular: Negative for chest pain, palpitations, orthopnea, claudication, leg swelling and PND.   Gastrointestinal: Negative for abdominal pain, blood in stool, constipation, diarrhea, heartburn, melena, nausea and vomiting.   Genitourinary: Negative for dysuria, flank pain,  frequency, hematuria and urgency.   Musculoskeletal: Negative for back pain, falls, joint pain, myalgias and neck pain.   Skin: Negative for itching and rash.   Neurological: Positive for dizziness. Negative for tingling, tremors, weakness and headaches.   Endo/Heme/Allergies: Negative for environmental allergies and polydipsia. Does not bruise/bleed easily.   Psychiatric/Behavioral: Negative for depression, hallucinations, substance abuse and suicidal ideas.       Past Medical History   has a past medical history of DJD (degenerative joint disease); Hyperlipidemia; Hypertension; Impaired fasting glucose; and Osteoarthritis.    Surgical History   has a past surgical history that includes polpectomy, large intestine; pr inj dx/ther agnt paravert facet joint, yoli* (5/22/2014); pr inj dx/ther agnt paravert facet joint, ce* (5/22/2014); and pr inj dx/ther agnt paravert facet joint, yoli* (5/22/2014).     Family History  family history includes Alcohol/Drug in his father; Heart Disease in his brother; Hyperlipidemia in his father; Hypertension in his brother and father; Lung Disease in his mother; Other in his brother and maternal grandfather.     Social History   reports that he quit smoking about 38 years ago. His smoking use included Cigarettes. He has a 28.00 pack-year smoking history. He has never used smokeless tobacco. He reports that he does not drink alcohol or use drugs.    Allergies  No Known Allergies    Medications  Prior to Admission Medications   Prescriptions Last Dose Informant Patient Reported? Taking?   Cholecalciferol (VITAMIN D) 1000 UNIT CAPS 6/9/2019 at 0800  No No   Sig: Take 1 Cap by mouth every day.   Levomefolate Glucosamine (METHYLFOLATE PO) 6/9/2019 at 0800  Yes No   Sig: Take 1,000 mg by mouth every day.   amLODIPine (NORVASC) 5 MG Tab 6/11/2019 at 0600  No No   Sig: Take 0.5 Tabs by mouth every day.   aspirin EC (ECOTRIN) 81 MG TBEC 6/9/2019 at 2300  Yes No   Sig: Take 81 mg by mouth every  day.   atorvastatin (LIPITOR) 80 MG tablet 6/9/2019 at 2300  No No   Sig: Take 1 Tab by mouth every evening.   cyanocobalamin (VITAMIN B12) 1000 MCG TABS 6/9/2019 at 0800  No No   Sig: Take 1 Tab by mouth every day.   gabapentin (NEURONTIN) 300 MG Cap 6/12/2019 at 1000  No No   Sig: Take 1 Cap by mouth 3 times a day.   lisinopril (PRINIVIL) 20 MG Tab 6/11/2019 at 0800  No No   Sig: Take 1 Tab by mouth every day.   omeprazole (PRILOSEC) 20 MG delayed-release capsule   No No   Sig: Take 1 Cap by mouth every day.      Facility-Administered Medications: None       Physical Exam  Temp:  [36.3 °C (97.4 °F)-36.6 °C (97.8 °F)] 36.6 °C (97.8 °F)  Pulse:  [80-83] 80  Resp:  [16-18] 16  BP: (104-115)/(48-54) 115/54  SpO2:  [91 %-93 %] 93 %    Physical Exam   Constitutional: He is oriented to person, place, and time. He appears well-developed and well-nourished.   HENT:   Head: Normocephalic and atraumatic.   Mouth/Throat: No oropharyngeal exudate.   Eyes: Conjunctivae are normal. No scleral icterus.   Neck: Normal range of motion. Neck supple. No JVD present. No tracheal deviation present. No thyromegaly present.   Cardiovascular: Normal rate, regular rhythm and intact distal pulses.  Exam reveals no gallop and no friction rub.    No murmur heard.  Pulmonary/Chest: Breath sounds normal. No stridor. He is in respiratory distress. He has no wheezes. He has no rales. He exhibits no tenderness.   Bilateral rhonchi     Abdominal: Soft. Bowel sounds are normal. He exhibits no distension and no mass. There is no tenderness. There is no rebound and no guarding.   Musculoskeletal: Normal range of motion. He exhibits edema.   Lymphadenopathy:     He has no cervical adenopathy.   Neurological: He is alert and oriented to person, place, and time. He has normal reflexes.   Nursing note and vitals reviewed.      Laboratory:          No results for input(s): ALTSGPT, ASTSGOT, ALKPHOSPHAT, TBILIRUBIN, DBILIRUBIN, GAMMAGT, AMYLASE, LIPASE,  ALB, PREALBUMIN, GLUCOSE in the last 72 hours.              No results for input(s): TROPONINI in the last 72 hours.    Urinalysis:    No results found     Imaging:  DX-CHEST-LIMITED (1 VIEW)    (Results Pending)         Assessment/Plan:  I anticipate this patient will require at least two midnights for appropriate medical management, necessitating inpatient admission.    * Community acquired pneumonia of right lower lobe of lung (HCC)   Assessment & Plan    Fever cough right sided infiltrate   CAP antibiotics ceftriaxone and azithromycin   Lactate is within normal limits  Procalcitonin.88  Follow blood and sputum cultures     Acute respiratory failure with hypoxia (HCC)   Assessment & Plan    Secondary to pneumonia   On 2L above his baseline     Neuropathy (HCC)- (present on admission)   Assessment & Plan    Cont gabapentin     Bilateral edema of lower extremity- (present on admission)   Assessment & Plan    Needs outpatient sleep study     Obesity (BMI 30-39.9)- (present on admission)   Assessment & Plan    Patient has been counseled on diet and lifestyle modifications  Recommended outpatient weight management program and bariatric surgery evaluation  Pt educated on the increase of morbidity and mortality associated with excess weight including DM, Heart Disease, HTN, stroke, and sleep apnea.  Pt advised weight loss of 5% through reduced calorie, low carb diet and 150 mins of exercise a week       CKD (chronic kidney disease) stage 3, GFR 30-59 ml/min ([facility])- (present on admission)   Assessment & Plan    Chronic kidney disease stage 3  Check hb  Check vitamin D and PTH  Monitor K+  ACEI - maintain SBP <140       Essential hypertension- (present on admission)   Assessment & Plan    Cont home meds     Mixed hyperlipidemia- (present on admission)   Assessment & Plan    F/u lipid panel in the am  Cont high dose statin         VTE prophylaxis: lovenox

## 2019-06-13 NOTE — CARE PLAN
Problem: Safety  Goal: Will remain free from falls  Outcome: PROGRESSING AS EXPECTED  Pt uses call light appropriately and has been educated on fall risk.     Problem: Infection  Goal: Will remain free from infection  Outcome: PROGRESSING AS EXPECTED  Discussed infection prevention with patient and hand hygiene. All questions answered.

## 2019-06-13 NOTE — FLOWSHEET NOTE
06/13/19 1030   Events/Summary/Plan   Events/Summary/Plan RCP completed   Non-Invasive Resp Device Site Inspection Completed Intact   Interdisciplinary Plan of Care-Goals (Indications)   Bronchodilator Indications Physical Exam / Hyperinflation / Wheezing (bronchospasm)   Interdisciplinary Plan of Care-Outcomes    Bronchodilator Outcome Diminished Wheezing and Volume of Air Movement Increased   Education   Education Yes - Pt. / Family has been Instructed in use of Respiratory Medications and Adverse Reactions;Yes - Pt. / Family has been Instructed in use of Respiratory Equipment   MDI/DPI Group   #MDI/DPI Given MDI/DPI x 1   Incentive Spirometry Group   Incentive Spirometry Instruction Yes   Breathing Exercises Yes   Incentive Spirometer Volume 1750 mL   Incentive Spirometer Date Last Changed 06/13/19   Incentive Spirometer Next Change Date (Q 30 Days) 07/13/19   Chest Exam   Respiration 18   Breath Sounds   RUL Breath Sounds Diminished;Expiratory Wheezes;Crackles   RML Breath Sounds Diminished;Expiratory Wheezes;Crackles   RLL Breath Sounds Diminished   KAY Breath Sounds Diminished;Expiratory Wheezes;Crackles   LLL Breath Sounds Diminished   Oximetry   #Pulse Oximetry (Single Determination) Yes   Oxygen   Home O2 Use Prior To Admission? No   Pulse Oximetry 93 %   O2 (LPM) 2   O2 Daily Delivery Respiratory  Silicone Nasal Cannula

## 2019-06-13 NOTE — ASSESSMENT & PLAN NOTE
Chronic kidney disease stage 3  -stable, cr slightly improved today and near his baseline  ACEI - maintain SBP <140

## 2019-06-13 NOTE — ED NOTES
Medication Reconciliation updated and complete per pt home pharmacy  ([facility])  No oral ABX within the last 14 days

## 2019-06-14 ENCOUNTER — APPOINTMENT (OUTPATIENT)
Dept: CARDIOLOGY | Facility: MEDICAL CENTER | Age: 74
DRG: 193 | End: 2019-06-14
Attending: INTERNAL MEDICINE
Payer: COMMERCIAL

## 2019-06-14 PROBLEM — I48.91 ATRIAL FIBRILLATION WITH RVR (HCC): Status: ACTIVE | Noted: 2019-06-14

## 2019-06-14 LAB
ANION GAP SERPL CALC-SCNC: 12 MMOL/L (ref 0–11.9)
BASOPHILS # BLD AUTO: 0.3 % (ref 0–1.8)
BASOPHILS # BLD: 0.02 K/UL (ref 0–0.12)
BUN SERPL-MCNC: 9 MG/DL (ref 8–22)
CALCIUM SERPL-MCNC: 8 MG/DL (ref 8.4–10.2)
CHLORIDE SERPL-SCNC: 102 MMOL/L (ref 96–112)
CO2 SERPL-SCNC: 23 MMOL/L (ref 20–33)
CREAT SERPL-MCNC: 1.14 MG/DL (ref 0.5–1.4)
EKG IMPRESSION: NORMAL
EOSINOPHIL # BLD AUTO: 0.06 K/UL (ref 0–0.51)
EOSINOPHIL NFR BLD: 1 % (ref 0–6.9)
ERYTHROCYTE [DISTWIDTH] IN BLOOD BY AUTOMATED COUNT: 50.5 FL (ref 35.9–50)
GLUCOSE BLD-MCNC: 119 MG/DL (ref 65–99)
GLUCOSE BLD-MCNC: 121 MG/DL (ref 65–99)
GLUCOSE BLD-MCNC: 133 MG/DL (ref 65–99)
GLUCOSE BLD-MCNC: 175 MG/DL (ref 65–99)
GLUCOSE BLD-MCNC: 210 MG/DL (ref 65–99)
GLUCOSE SERPL-MCNC: 128 MG/DL (ref 65–99)
GRAM STN SPEC: NORMAL
HCT VFR BLD AUTO: 43.2 % (ref 42–52)
HGB BLD-MCNC: 13.9 G/DL (ref 14–18)
IMM GRANULOCYTES # BLD AUTO: 0.04 K/UL (ref 0–0.11)
IMM GRANULOCYTES NFR BLD AUTO: 0.7 % (ref 0–0.9)
LV EJECT FRACT  99904: 60
LYMPHOCYTES # BLD AUTO: 0.91 K/UL (ref 1–4.8)
LYMPHOCYTES NFR BLD: 14.9 % (ref 22–41)
MCH RBC QN AUTO: 30.8 PG (ref 27–33)
MCHC RBC AUTO-ENTMCNC: 32.2 G/DL (ref 33.7–35.3)
MCV RBC AUTO: 95.8 FL (ref 81.4–97.8)
MONOCYTES # BLD AUTO: 0.49 K/UL (ref 0–0.85)
MONOCYTES NFR BLD AUTO: 8 % (ref 0–13.4)
NEUTROPHILS # BLD AUTO: 4.57 K/UL (ref 1.82–7.42)
NEUTROPHILS NFR BLD: 75.1 % (ref 44–72)
NRBC # BLD AUTO: 0 K/UL
NRBC BLD-RTO: 0 /100 WBC
PLATELET # BLD AUTO: 138 K/UL (ref 164–446)
PMV BLD AUTO: 10.3 FL (ref 9–12.9)
POTASSIUM SERPL-SCNC: 3.6 MMOL/L (ref 3.6–5.5)
PROCALCITONIN SERPL-MCNC: 0.87 NG/ML
RBC # BLD AUTO: 4.51 M/UL (ref 4.7–6.1)
SIGNIFICANT IND 70042: NORMAL
SITE SITE: NORMAL
SODIUM SERPL-SCNC: 137 MMOL/L (ref 135–145)
SOURCE SOURCE: NORMAL
WBC # BLD AUTO: 6.1 K/UL (ref 4.8–10.8)

## 2019-06-14 PROCEDURE — 99233 SBSQ HOSP IP/OBS HIGH 50: CPT | Performed by: INTERNAL MEDICINE

## 2019-06-14 PROCEDURE — 700102 HCHG RX REV CODE 250 W/ 637 OVERRIDE(OP): Performed by: HOSPITALIST

## 2019-06-14 PROCEDURE — 93306 TTE W/DOPPLER COMPLETE: CPT

## 2019-06-14 PROCEDURE — 94760 N-INVAS EAR/PLS OXIMETRY 1: CPT

## 2019-06-14 PROCEDURE — 700111 HCHG RX REV CODE 636 W/ 250 OVERRIDE (IP)

## 2019-06-14 PROCEDURE — 85025 COMPLETE CBC W/AUTO DIFF WBC: CPT

## 2019-06-14 PROCEDURE — 700105 HCHG RX REV CODE 258: Performed by: HOSPITALIST

## 2019-06-14 PROCEDURE — 82962 GLUCOSE BLOOD TEST: CPT | Mod: 91

## 2019-06-14 PROCEDURE — 700102 HCHG RX REV CODE 250 W/ 637 OVERRIDE(OP): Performed by: INTERNAL MEDICINE

## 2019-06-14 PROCEDURE — A9270 NON-COVERED ITEM OR SERVICE: HCPCS | Performed by: INTERNAL MEDICINE

## 2019-06-14 PROCEDURE — A9270 NON-COVERED ITEM OR SERVICE: HCPCS | Performed by: HOSPITALIST

## 2019-06-14 PROCEDURE — 700111 HCHG RX REV CODE 636 W/ 250 OVERRIDE (IP): Performed by: HOSPITALIST

## 2019-06-14 PROCEDURE — 700111 HCHG RX REV CODE 636 W/ 250 OVERRIDE (IP): Performed by: INTERNAL MEDICINE

## 2019-06-14 PROCEDURE — 93306 TTE W/DOPPLER COMPLETE: CPT | Mod: 26 | Performed by: INTERNAL MEDICINE

## 2019-06-14 PROCEDURE — 84145 PROCALCITONIN (PCT): CPT

## 2019-06-14 PROCEDURE — 770020 HCHG ROOM/CARE - TELE (206)

## 2019-06-14 PROCEDURE — 700101 HCHG RX REV CODE 250: Performed by: INTERNAL MEDICINE

## 2019-06-14 PROCEDURE — 80048 BASIC METABOLIC PNL TOTAL CA: CPT

## 2019-06-14 PROCEDURE — 700117 HCHG RX CONTRAST REV CODE 255: Performed by: INTERNAL MEDICINE

## 2019-06-14 PROCEDURE — 94640 AIRWAY INHALATION TREATMENT: CPT

## 2019-06-14 RX ORDER — LEVALBUTEROL INHALATION SOLUTION 0.63 MG/3ML
0.63 SOLUTION RESPIRATORY (INHALATION)
Status: DISCONTINUED | OUTPATIENT
Start: 2019-06-14 | End: 2019-06-16 | Stop reason: HOSPADM

## 2019-06-14 RX ORDER — DILTIAZEM HYDROCHLORIDE 5 MG/ML
INJECTION INTRAVENOUS
Status: COMPLETED
Start: 2019-06-14 | End: 2019-06-14

## 2019-06-14 RX ORDER — PREDNISONE 20 MG/1
40 TABLET ORAL DAILY
Status: DISCONTINUED | OUTPATIENT
Start: 2019-06-14 | End: 2019-06-16 | Stop reason: HOSPADM

## 2019-06-14 RX ORDER — DILTIAZEM HYDROCHLORIDE 5 MG/ML
10 INJECTION INTRAVENOUS EVERY 4 HOURS PRN
Status: DISCONTINUED | OUTPATIENT
Start: 2019-06-14 | End: 2019-06-16 | Stop reason: HOSPADM

## 2019-06-14 RX ADMIN — ENOXAPARIN SODIUM 100 MG: 100 INJECTION SUBCUTANEOUS at 00:37

## 2019-06-14 RX ADMIN — CEFTRIAXONE SODIUM 2 G: 2 INJECTION, POWDER, FOR SOLUTION INTRAMUSCULAR; INTRAVENOUS at 05:38

## 2019-06-14 RX ADMIN — DILTIAZEM HYDROCHLORIDE 10 MG: 5 INJECTION INTRAVENOUS at 07:55

## 2019-06-14 RX ADMIN — ALBUTEROL SULFATE 2 PUFF: 90 AEROSOL, METERED RESPIRATORY (INHALATION) at 00:38

## 2019-06-14 RX ADMIN — DILTIAZEM HYDROCHLORIDE 30 MG: 30 TABLET, FILM COATED ORAL at 00:39

## 2019-06-14 RX ADMIN — ALBUTEROL SULFATE 2 PUFF: 90 AEROSOL, METERED RESPIRATORY (INHALATION) at 05:46

## 2019-06-14 RX ADMIN — DILTIAZEM HYDROCHLORIDE 10 MG: 5 INJECTION INTRAVENOUS at 02:25

## 2019-06-14 RX ADMIN — HUMAN ALBUMIN MICROSPHERES AND PERFLUTREN 3 ML: 10; .22 INJECTION, SOLUTION INTRAVENOUS at 16:47

## 2019-06-14 RX ADMIN — LEVALBUTEROL HYDROCHLORIDE 0.63 MG: 0.63 SOLUTION RESPIRATORY (INHALATION) at 14:05

## 2019-06-14 RX ADMIN — DILTIAZEM HYDROCHLORIDE 30 MG: 30 TABLET, FILM COATED ORAL at 11:35

## 2019-06-14 RX ADMIN — GABAPENTIN 300 MG: 300 CAPSULE ORAL at 17:42

## 2019-06-14 RX ADMIN — AZITHROMYCIN 250 MG: 250 TABLET, FILM COATED ORAL at 20:03

## 2019-06-14 RX ADMIN — LEVALBUTEROL HYDROCHLORIDE 0.63 MG: 0.63 SOLUTION RESPIRATORY (INHALATION) at 18:57

## 2019-06-14 RX ADMIN — ATORVASTATIN CALCIUM 80 MG: 40 TABLET, FILM COATED ORAL at 17:42

## 2019-06-14 RX ADMIN — LEVALBUTEROL HYDROCHLORIDE 0.63 MG: 0.63 SOLUTION RESPIRATORY (INHALATION) at 09:59

## 2019-06-14 RX ADMIN — ASPIRIN 81 MG: 81 TABLET, COATED ORAL at 05:37

## 2019-06-14 RX ADMIN — ENOXAPARIN SODIUM 100 MG: 100 INJECTION SUBCUTANEOUS at 11:34

## 2019-06-14 RX ADMIN — GUAIFENESIN 600 MG: 600 TABLET, EXTENDED RELEASE ORAL at 05:37

## 2019-06-14 RX ADMIN — DILTIAZEM HYDROCHLORIDE 30 MG: 30 TABLET, FILM COATED ORAL at 05:37

## 2019-06-14 RX ADMIN — GABAPENTIN 300 MG: 300 CAPSULE ORAL at 09:49

## 2019-06-14 RX ADMIN — VITAMIN D, TAB 1000IU (100/BT) 1000 UNITS: 25 TAB at 05:37

## 2019-06-14 RX ADMIN — PREDNISONE 40 MG: 20 TABLET ORAL at 11:35

## 2019-06-14 RX ADMIN — CYANOCOBALAMIN TAB 500 MCG 1000 MCG: 500 TAB at 05:37

## 2019-06-14 RX ADMIN — DILTIAZEM HYDROCHLORIDE 30 MG: 30 TABLET, FILM COATED ORAL at 17:43

## 2019-06-14 RX ADMIN — GABAPENTIN 300 MG: 300 CAPSULE ORAL at 00:37

## 2019-06-14 RX ADMIN — GUAIFENESIN 600 MG: 600 TABLET, EXTENDED RELEASE ORAL at 17:42

## 2019-06-14 ASSESSMENT — ENCOUNTER SYMPTOMS
FEVER: 0
COUGH: 1
PALPITATIONS: 0
WEAKNESS: 1
SHORTNESS OF BREATH: 1
SPUTUM PRODUCTION: 1
DIARRHEA: 0

## 2019-06-14 NOTE — PROGRESS NOTES
Patient was given PO Diltiazem and given PRN IV Diltiazem x1 as ordered per MAR for sustained AFib. Rate has been 130s-150s. Last IV dose 0225. Dr. Varghese notified of the above. Continue current plan of care and administration of scheduled Diltiazem and PRN Diltiazem for heart rate control.

## 2019-06-14 NOTE — PROGRESS NOTES
Gave bedside report to LOWELL Ennis. Plan of care discussed. Safety precautions in place. Personal belongings and call light are with in reach. Patient has no additional needs at this time.

## 2019-06-14 NOTE — CARE PLAN
Problem: Pain Management  Goal: Pain level will decrease to patient's comfort goal  Outcome: PROGRESSING AS EXPECTED  Collaborate with patient and interdisciplinary team to manage pain and keep near or below comfort level goal. Educate on and incorporate non-pharmacologic interventions to reduce pain.      Problem: Respiratory:  Goal: Respiratory status will improve  Outcome: PROGRESSING AS EXPECTED  Assess and monitor pulmonary status and titrate oxygen to maintain SPo2 above 90%. RT per protocol. Encourage incentive spirometer, deep breathing, turning, and coughing.

## 2019-06-14 NOTE — PROGRESS NOTES
Telemetry Shift Summary    Rhythm Afib/flutter - converted to SR @1000  HR Range 70-80s  Ectopy Freq PVCs, Freq bigem, triplets,  Rare couplets  Measurements -/.08/- (pt still in afib during measurements)    4 beats of VT at 0700        Normal Values  Rhythm SR  HR Range    Measurements 0.12-0.20 / 0.06-0.10  / 0.30-0.52

## 2019-06-14 NOTE — DISCHARGE PLANNING
Pt lives at home with his wife and step son. Pt not currently on service for 02. Pt is independent with ADLs, no DME. Pt has no HHC history. No current d/c needs. CM team will follow.     Care Transition Team Assessment    Information Source  Information Given By: Patient  Who is responsible for making decisions for patient? : Patient    Readmission Evaluation  Is this a readmission?: No    Elopement Risk  Legal Hold: No  Ambulatory or Self Mobile in Wheelchair: No-Not an Elopement Risk  Elopement Risk: Not at Risk for Elopement    Interdisciplinary Discharge Planning  Does Admitting Nurse Feel This Could be a Complex Discharge?: No  Primary Care Physician: Rommel  Lives with - Patient's Self Care Capacity: Spouse, Adult Children  Patient or legal guardian wants to designate a caregiver (see row info): No  Support Systems: Family Member(s), Spouse / Significant Other  Housing / Facility: [address]  Do You Take your Prescribed Medications Regularly: Yes  Able to Return to Previous ADL's: Yes  Mobility Issues: Yes (Hip pain)  Prior Services: None  Assistance Needed: No  Durable Medical Equipment: Not Applicable    Discharge Preparedness  What is your plan after discharge?: Uncertain - pending medical team collaboration  What are your discharge supports?: Child, Spouse  Prior Functional Level: Independent with Activities of Daily Living  Difficulity with ADLs: None  Difficulity with IADLs: None    Functional Assesment  Prior Functional Level: Independent with Activities of Daily Living    Finances  Financial Barriers to Discharge: No  Prescription Coverage: Yes    Vision / Hearing Impairment  Vision Impairment : Yes  Right Eye Vision: Impaired, Wears Glasses  Left Eye Vision: Impaired, Wears Glasses  Hearing Impairment : Yes  Hearing Impairment: Both Ears, Hearing Device Not Available  Does Pt Need Special Equipment for the Hearing Impaired?: No         Advance Directive  Advance Directive?: None    Domestic  Abuse  Have you ever been the victim of abuse or violence?: No  Physical Abuse or Sexual Abuse: No  Verbal Abuse or Emotional Abuse: No  Possible Abuse Reported to:: Not Applicable    Psychological Assessment  History of Substance Abuse: None  History of Psychiatric Problems: No  Non-compliant with Treatment: No  Newly Diagnosed Illness: No    Discharge Risks or Barriers  Discharge risks or barriers?: No  Patient risk factors: Vulnerable adult    Anticipated Discharge Information  Anticipated discharge disposition: Discharge needs currently unknown

## 2019-06-14 NOTE — PROGRESS NOTES
Dr. Varghese notified that patient had 51 seconds of atrial fib - rate of 180s at 2239. Patient went back to sinus rhythm/sinus tachycardia. Continue with current plan of care.

## 2019-06-14 NOTE — ASSESSMENT & PLAN NOTE
-paroxysmal, likely related to concurrent respiratory issues  -start dilt 30 mg Q6 hours, change to DILT  mg dialy  -ECHO is ok, remains in NSR

## 2019-06-14 NOTE — PROGRESS NOTES
Report received. Assumed care of pt. A/O x4. VSS. Responds appropriately. Denies pain, denies SOB. Assessment completed. Explained importance of calling before getting OOB. Call light and belongings within reach. Bed in the lowest position. Treaded socks in place. Hourly rounding in progress. Safety precautions in place

## 2019-06-14 NOTE — PROGRESS NOTES
Report received. Assumed care of pt. A/O x4. VSS. Responds appropriately. Assessment completed. Call light and belongings within reach. Bed in the lowest position. Treaded socks in place. Hourly rounding in progress. Safety precautions in place

## 2019-06-14 NOTE — FLOWSHEET NOTE
06/14/19 1001   Events/Summary/Plan   Events/Summary/Plan Found pt on RA sats 87%, placed pt on 1L to keep sats >90%. Tx given   Non-Invasive Resp Device Site Inspection Completed Intact   Interdisciplinary Plan of Care-Goals (Indications)   Bronchodilator Indications Physical Exam / Hyperinflation / Wheezing (bronchospasm)   Interdisciplinary Plan of Care-Outcomes    Bronchodilator Outcome Diminished Wheezing and Volume of Air Movement Increased   Education   Education Yes - Pt. / Family has been Instructed in use of Respiratory Medications and Adverse Reactions;Yes - Pt. / Family has been Instructed in use of Respiratory Equipment   RT Assessment of Delivered Medications   Evaluation of Medication Delivery Daily Yes-- Pt /Family has been Instructed in use of Respiratory Medications and Adverse Reactions   SVN Group   #SVN Performed Yes   Given By: Mouthpiece   Date SVN Last Changed 06/14/19   Date SVN Next Change Due (Q 7 Days) 07/14/19   Chest Exam   Respiration 18   Pulse 76   Breath Sounds   Pre/Post Intervention Pre Intervention Assessment  (still wheezing but good air movement post tx)   RUL Breath Sounds Crackles;Expiratory Wheezes   RML Breath Sounds Crackles;Expiratory Wheezes   RLL Breath Sounds Diminished   KAY Breath Sounds Crackles;Expiratory Wheezes   LLL Breath Sounds Diminished   Oximetry   #Pulse Oximetry (Single Determination) Yes   Oxygen   Home O2 Use Prior To Admission? No   Pulse Oximetry 92 %   O2 (LPM) 1   O2 Daily Delivery Respiratory  Silicone Nasal Cannula

## 2019-06-14 NOTE — PROGRESS NOTES
Received bedside report from LOWELL Alejandre. Plan of care discussed. Safety precautions in place. Call light and personal belongings within reach. Patient has no needs at this time. Family at bedside.

## 2019-06-14 NOTE — FLOWSHEET NOTE
06/14/19 1405   Events/Summary/Plan   Events/Summary/Plan Tx given   Non-Invasive Resp Device Site Inspection Completed Intact   Interdisciplinary Plan of Care-Goals (Indications)   Bronchodilator Indications Physical Exam / Hyperinflation / Wheezing (bronchospasm)   Interdisciplinary Plan of Care-Outcomes    Bronchodilator Outcome Diminished Wheezing and Volume of Air Movement Increased   Education   Education Yes - Pt. / Family has been Instructed in use of Respiratory Medications and Adverse Reactions;Yes - Pt. / Family has been Instructed in use of Respiratory Equipment   RT Assessment of Delivered Medications   Evaluation of Medication Delivery Daily Yes-- Pt /Family has been Instructed in use of Respiratory Medications and Adverse Reactions   SVN Group   #SVN Performed Yes   Given By: Mouthpiece   Respiratory WDL   Respiratory (WDL) X   Chest Exam   Respiration 18   Pulse 84   Breath Sounds   Pre/Post Intervention Post Intervention Assessment   RUL Breath Sounds Expiratory Wheezes;Crackles   RML Breath Sounds Expiratory Wheezes;Crackles   RLL Breath Sounds Expiratory Wheezes;Fine Crackles   KAY Breath Sounds Expiratory Wheezes;Crackles   LLL Breath Sounds Expiratory Wheezes;Fine Crackles   Secretions   Cough Non Productive   How Sputum Obtained Spontaneous   Oximetry   #Pulse Oximetry (Single Determination) Yes   Oxygen   Pulse Oximetry 95 %  (During tx)   O2 (LPM) 1   O2 Daily Delivery Respiratory  Silicone Nasal Cannula

## 2019-06-14 NOTE — PROGRESS NOTES
Telemetry Shift Summary    Rhythm SR until 2310, then AFib  HR Range in SR 90s, in AFib 130s-150s rate up to 180s  Ectopy fPVC, rBi, rCoup, rTrip  Measurements 0.16/0.08/0.36        Normal Values  Rhythm SR  HR Range    Measurements 0.12-0.20 / 0.06-0.10  / 0.30-0.52

## 2019-06-14 NOTE — PROGRESS NOTES
Hospital Medicine Daily Progress Note    Date of Service  6/14/2019    Chief Complaint  74 y.o. male admitted 6/12/2019 with CAP    Hospital Course    see below      Interval Problem Update  CAP-no obvious improvements, continues to cough. Remains afebrile and appears to be tolerating IV abx's without issue.    A fib RVR-went into this issue last night, HR up to 180's. Given rounds of IV dilt and then started on oral dilt. Patient denies any obvious symptoms with it. No prior history. Started on weight based lovenox. Converted late this AM to NSR.    Consultants/Specialty  none    Code Status  fcfc    Disposition  TELE    Review of Systems  Review of Systems   Constitutional: Negative for fever.        Slight improvement     Respiratory: Positive for cough, sputum production and shortness of breath.         Very small improvement   Cardiovascular: Negative for palpitations and leg swelling.   Gastrointestinal: Negative for diarrhea.   Genitourinary: Negative for urgency.   Neurological: Positive for weakness.   All other systems reviewed and are negative.       Physical Exam  Temp:  [36.4 °C (97.6 °F)-37.2 °C (99 °F)] 36.4 °C (97.6 °F)  Pulse:  [] 76  Resp:  [18] 18  BP: ()/(50-96) 110/69  SpO2:  [91 %-93 %] 92 %    Physical Exam   Constitutional: He is oriented to person, place, and time. He appears well-developed and well-nourished. No distress.   Speaks in full sentences   HENT:   Head: Normocephalic and atraumatic.   Mouth/Throat: No oropharyngeal exudate.   NC in place   Eyes: Pupils are equal, round, and reactive to light. Right eye exhibits no discharge. Left eye exhibits no discharge.   Neck: Normal range of motion. Neck supple.   Cardiovascular: Normal heart sounds and intact distal pulses.    No murmur heard.  Irregular irregular, tachycardic   Pulmonary/Chest: Effort normal. No stridor. No respiratory distress. He has rales.   Rhonci throughout both lungs-no obvious change today   Abdominal:  Soft. Bowel sounds are normal. There is no tenderness.   Musculoskeletal: Normal range of motion. He exhibits no tenderness.   Neurological: He is alert and oriented to person, place, and time. No cranial nerve deficit.   Skin: Skin is warm and dry. No rash noted.   Psychiatric: He has a normal mood and affect.   Nursing note and vitals reviewed.      Fluids    Intake/Output Summary (Last 24 hours) at 06/14/19 1308  Last data filed at 06/14/19 0850   Gross per 24 hour   Intake              220 ml   Output                0 ml   Net              220 ml       Laboratory  Recent Labs      06/13/19 0057 06/13/19 0757 06/14/19   0342   WBC  5.7  5.2  6.1   RBC  4.25*  4.19*  4.51*   HEMOGLOBIN  13.6*  13.2*  13.9*   HEMATOCRIT  40.6*  39.9*  43.2   MCV  95.5  95.2  95.8   MCH  32.0  31.5  30.8   MCHC  33.5*  33.1*  32.2*   RDW  51.3*  51.2*  50.5*   PLATELETCT  132*  125*  138*   MPV  10.2  9.9  10.3     Recent Labs      06/13/19 0057 06/13/19 0757 06/14/19   0342   SODIUM  135  134*  137   POTASSIUM  3.9  3.8  3.6   CHLORIDE  102  103  102   CO2  23  24  23   GLUCOSE  146*  118*  128*   BUN  19  13  9   CREATININE  1.49*  1.24  1.14   CALCIUM  7.9*  7.8*  8.0*             Recent Labs      06/13/19 0057   TRIGLYCERIDE  51   HDL  34*   LDL  24       Imaging  DX-CHEST-PORTABLE (1 VIEW)   Final Result         Patchy opacities in the right mid, right lower lung and left lower lung, concerning for multifocal pneumonia.      EC-ECHOCARDIOGRAM COMPLETE W/ CONT    (Results Pending)        Assessment/Plan  * Community acquired pneumonia of right lower lobe of lung (HCC)- (present on admission)   Assessment & Plan    -multi focal on CXR and exam, with diffuse rhonci with no clear clinical improvement yet, however remains afebrile  CAP antibiotics ceftriaxone and azithromycin, continue day 3/5?  Lactate is within normal limits  -procal 1.92  Follow blood and sputum cultures-remains Negative thus far  -O2  supplementation, try to wean  -stop IVF's  -add prednisone 40 mg daily X5 days.     Acute respiratory failure with hypoxia (HCC)- (present on admission)   Assessment & Plan    Secondary to pneumonia   On 2L, no changes, try to wean     Atrial fibrillation with RVR ([facility])   Assessment & Plan    -paroxysmal, likely related to concurrent respiratory issues  -start dilt 30 mg Q6 hours  -ECHO  -monitor on tele, replace lytes PRN  -change albuterol to xopenex PRN  -started on weight based lovenox, might be able to stop entirely since A fib short lived, monitor overnight     Neuropathy (HCC)- (present on admission)   Assessment & Plan    Cont gabapentin     Bilateral edema of lower extremity- (present on admission)   Assessment & Plan    Needs outpatient sleep study, monitor volume status closely     Obesity (BMI 30-39.9)- (present on admission)   Assessment & Plan    Patient has been counseled on diet and lifestyle modifications  Recommended outpatient weight management program and bariatric surgery evaluation  Pt educated on the increase of morbidity and mortality associated with excess weight including DM, Heart Disease, HTN, stroke, and sleep apnea.  Pt advised weight loss of 5% through reduced calorie, low carb diet and 150 mins of exercise a week       CKD (chronic kidney disease) stage 3, GFR 30-59 ml/min ([facility])- (present on admission)   Assessment & Plan    Chronic kidney disease stage 3  -stable, cr slightly improved today and near his baseline  ACEI - maintain SBP <140       Essential hypertension- (present on admission)   Assessment & Plan    Cont home meds, well controlled at this time     Mixed hyperlipidemia- (present on admission)   Assessment & Plan    -statin          VTE prophylaxis: lovenox

## 2019-06-15 LAB
ANION GAP SERPL CALC-SCNC: 8 MMOL/L (ref 0–11.9)
BASOPHILS # BLD AUTO: 0.2 % (ref 0–1.8)
BASOPHILS # BLD: 0.01 K/UL (ref 0–0.12)
BUN SERPL-MCNC: 11 MG/DL (ref 8–22)
CALCIUM SERPL-MCNC: 8.7 MG/DL (ref 8.4–10.2)
CHLORIDE SERPL-SCNC: 103 MMOL/L (ref 96–112)
CO2 SERPL-SCNC: 26 MMOL/L (ref 20–33)
CREAT SERPL-MCNC: 1.11 MG/DL (ref 0.5–1.4)
EOSINOPHIL # BLD AUTO: 0 K/UL (ref 0–0.51)
EOSINOPHIL NFR BLD: 0 % (ref 0–6.9)
ERYTHROCYTE [DISTWIDTH] IN BLOOD BY AUTOMATED COUNT: 49.8 FL (ref 35.9–50)
GLUCOSE BLD-MCNC: 129 MG/DL (ref 65–99)
GLUCOSE BLD-MCNC: 165 MG/DL (ref 65–99)
GLUCOSE BLD-MCNC: 176 MG/DL (ref 65–99)
GLUCOSE BLD-MCNC: 235 MG/DL (ref 65–99)
GLUCOSE SERPL-MCNC: 191 MG/DL (ref 65–99)
HCT VFR BLD AUTO: 40.9 % (ref 42–52)
HGB BLD-MCNC: 13.4 G/DL (ref 14–18)
IMM GRANULOCYTES # BLD AUTO: 0.02 K/UL (ref 0–0.11)
IMM GRANULOCYTES NFR BLD AUTO: 0.4 % (ref 0–0.9)
L PNEUMO IGG TITR SER IF: NORMAL {TITER}
LYMPHOCYTES # BLD AUTO: 0.51 K/UL (ref 1–4.8)
LYMPHOCYTES NFR BLD: 9.5 % (ref 22–41)
MAGNESIUM SERPL-MCNC: 2.2 MG/DL (ref 1.5–2.5)
MCH RBC QN AUTO: 31.2 PG (ref 27–33)
MCHC RBC AUTO-ENTMCNC: 32.8 G/DL (ref 33.7–35.3)
MCV RBC AUTO: 95.1 FL (ref 81.4–97.8)
MONOCYTES # BLD AUTO: 0.44 K/UL (ref 0–0.85)
MONOCYTES NFR BLD AUTO: 8.2 % (ref 0–13.4)
NEUTROPHILS # BLD AUTO: 4.39 K/UL (ref 1.82–7.42)
NEUTROPHILS NFR BLD: 81.7 % (ref 44–72)
NRBC # BLD AUTO: 0 K/UL
NRBC BLD-RTO: 0 /100 WBC
PLATELET # BLD AUTO: 175 K/UL (ref 164–446)
PMV BLD AUTO: 10.1 FL (ref 9–12.9)
POTASSIUM SERPL-SCNC: 3.8 MMOL/L (ref 3.6–5.5)
RBC # BLD AUTO: 4.3 M/UL (ref 4.7–6.1)
SODIUM SERPL-SCNC: 137 MMOL/L (ref 135–145)
WBC # BLD AUTO: 5.4 K/UL (ref 4.8–10.8)

## 2019-06-15 PROCEDURE — 700102 HCHG RX REV CODE 250 W/ 637 OVERRIDE(OP): Performed by: HOSPITALIST

## 2019-06-15 PROCEDURE — 700102 HCHG RX REV CODE 250 W/ 637 OVERRIDE(OP): Performed by: INTERNAL MEDICINE

## 2019-06-15 PROCEDURE — 700111 HCHG RX REV CODE 636 W/ 250 OVERRIDE (IP): Performed by: INTERNAL MEDICINE

## 2019-06-15 PROCEDURE — 770020 HCHG ROOM/CARE - TELE (206)

## 2019-06-15 PROCEDURE — 83735 ASSAY OF MAGNESIUM: CPT

## 2019-06-15 PROCEDURE — 700105 HCHG RX REV CODE 258: Performed by: HOSPITALIST

## 2019-06-15 PROCEDURE — 94760 N-INVAS EAR/PLS OXIMETRY 1: CPT

## 2019-06-15 PROCEDURE — 85025 COMPLETE CBC W/AUTO DIFF WBC: CPT

## 2019-06-15 PROCEDURE — A9270 NON-COVERED ITEM OR SERVICE: HCPCS | Performed by: HOSPITALIST

## 2019-06-15 PROCEDURE — 82962 GLUCOSE BLOOD TEST: CPT | Mod: 91

## 2019-06-15 PROCEDURE — 700111 HCHG RX REV CODE 636 W/ 250 OVERRIDE (IP): Performed by: HOSPITALIST

## 2019-06-15 PROCEDURE — 700101 HCHG RX REV CODE 250: Performed by: INTERNAL MEDICINE

## 2019-06-15 PROCEDURE — A9270 NON-COVERED ITEM OR SERVICE: HCPCS | Performed by: INTERNAL MEDICINE

## 2019-06-15 PROCEDURE — 80048 BASIC METABOLIC PNL TOTAL CA: CPT

## 2019-06-15 PROCEDURE — 99232 SBSQ HOSP IP/OBS MODERATE 35: CPT | Performed by: INTERNAL MEDICINE

## 2019-06-15 PROCEDURE — 94640 AIRWAY INHALATION TREATMENT: CPT

## 2019-06-15 RX ORDER — DILTIAZEM HYDROCHLORIDE 120 MG/1
120 CAPSULE, COATED, EXTENDED RELEASE ORAL
Status: DISCONTINUED | OUTPATIENT
Start: 2019-06-15 | End: 2019-06-16 | Stop reason: HOSPADM

## 2019-06-15 RX ADMIN — LEVALBUTEROL HYDROCHLORIDE 0.63 MG: 0.63 SOLUTION RESPIRATORY (INHALATION) at 01:19

## 2019-06-15 RX ADMIN — DILTIAZEM HYDROCHLORIDE 30 MG: 30 TABLET, FILM COATED ORAL at 05:38

## 2019-06-15 RX ADMIN — GUAIFENESIN 600 MG: 600 TABLET, EXTENDED RELEASE ORAL at 17:17

## 2019-06-15 RX ADMIN — AZITHROMYCIN 250 MG: 250 TABLET, FILM COATED ORAL at 20:01

## 2019-06-15 RX ADMIN — GABAPENTIN 300 MG: 300 CAPSULE ORAL at 17:17

## 2019-06-15 RX ADMIN — GABAPENTIN 300 MG: 300 CAPSULE ORAL at 09:56

## 2019-06-15 RX ADMIN — DILTIAZEM HYDROCHLORIDE 120 MG: 120 CAPSULE, COATED, EXTENDED RELEASE ORAL at 09:56

## 2019-06-15 RX ADMIN — CYANOCOBALAMIN TAB 500 MCG 1000 MCG: 500 TAB at 05:38

## 2019-06-15 RX ADMIN — ATORVASTATIN CALCIUM 80 MG: 40 TABLET, FILM COATED ORAL at 17:17

## 2019-06-15 RX ADMIN — ENOXAPARIN SODIUM 100 MG: 100 INJECTION SUBCUTANEOUS at 00:03

## 2019-06-15 RX ADMIN — GABAPENTIN 300 MG: 300 CAPSULE ORAL at 00:03

## 2019-06-15 RX ADMIN — INSULIN HUMAN 2 UNITS: 100 INJECTION, SOLUTION PARENTERAL at 17:17

## 2019-06-15 RX ADMIN — INSULIN HUMAN 3 UNITS: 100 INJECTION, SOLUTION PARENTERAL at 11:46

## 2019-06-15 RX ADMIN — LEVALBUTEROL HYDROCHLORIDE 0.63 MG: 0.63 SOLUTION RESPIRATORY (INHALATION) at 06:44

## 2019-06-15 RX ADMIN — ASPIRIN 81 MG: 81 TABLET, COATED ORAL at 05:38

## 2019-06-15 RX ADMIN — LEVALBUTEROL HYDROCHLORIDE 0.63 MG: 0.63 SOLUTION RESPIRATORY (INHALATION) at 13:53

## 2019-06-15 RX ADMIN — ENOXAPARIN SODIUM 100 MG: 100 INJECTION SUBCUTANEOUS at 11:50

## 2019-06-15 RX ADMIN — VITAMIN D, TAB 1000IU (100/BT) 1000 UNITS: 25 TAB at 05:38

## 2019-06-15 RX ADMIN — PREDNISONE 40 MG: 20 TABLET ORAL at 05:39

## 2019-06-15 RX ADMIN — LEVALBUTEROL HYDROCHLORIDE 0.63 MG: 0.63 SOLUTION RESPIRATORY (INHALATION) at 18:49

## 2019-06-15 RX ADMIN — DILTIAZEM HYDROCHLORIDE 30 MG: 30 TABLET, FILM COATED ORAL at 00:03

## 2019-06-15 RX ADMIN — INSULIN HUMAN 2 UNITS: 100 INJECTION, SOLUTION PARENTERAL at 20:02

## 2019-06-15 RX ADMIN — CEFTRIAXONE SODIUM 2 G: 2 INJECTION, POWDER, FOR SOLUTION INTRAMUSCULAR; INTRAVENOUS at 05:39

## 2019-06-15 RX ADMIN — GUAIFENESIN 600 MG: 600 TABLET, EXTENDED RELEASE ORAL at 05:38

## 2019-06-15 ASSESSMENT — ENCOUNTER SYMPTOMS
NAUSEA: 0
SPUTUM PRODUCTION: 1
ABDOMINAL PAIN: 0
PALPITATIONS: 0
WEAKNESS: 0
SHORTNESS OF BREATH: 1
VOMITING: 0
CHILLS: 0
COUGH: 1
FEVER: 0

## 2019-06-15 NOTE — PROGRESS NOTES
Patient walked with wife to elevators and back with no o2. When patient came back he was 88 RA but immediately came up to 93. Currently back in bed. No complaints. Bed in low locked position, call bell within reach. Continue to monitor.

## 2019-06-15 NOTE — PROGRESS NOTES
Called MD about putting orders in for tele for pt, pt has been converting in and out of Afib, currently NSR. MD to put in tele order

## 2019-06-15 NOTE — FLOWSHEET NOTE
06/15/19 0644   Events/Summary/Plan   Events/Summary/Plan Tx given   Non-Invasive Resp Device Site Inspection Completed Intact   Interdisciplinary Plan of Care-Goals (Indications)   Bronchodilator Indications Physical Exam / Hyperinflation / Wheezing (bronchospasm)   Interdisciplinary Plan of Care-Outcomes    Bronchodilator Outcome Diminished Wheezing and Volume of Air Movement Increased   Education   Education Yes - Pt. / Family has been Instructed in use of Respiratory Medications and Adverse Reactions;Yes - Pt. / Family has been Instructed in use of Respiratory Equipment   RT Assessment of Delivered Medications   Evaluation of Medication Delivery Daily Yes-- Pt /Family has been Instructed in use of Respiratory Medications and Adverse Reactions   SVN Group   #SVN Performed Yes   Given By: Mouthpiece   Incentive Spirometry Group   Breathing Exercises Yes   Incentive Spirometer Volume 2000 mL   Incentive Spirometer Next Change Date (Q 30 Days) 07/15/19   Chest Exam   Respiration 18   Pulse 71   Breath Sounds   Pre/Post Intervention Post Intervention Assessment   RUL Breath Sounds Clear;Expiratory Wheezes   RML Breath Sounds Diminished   RLL Breath Sounds Diminished   KAY Breath Sounds Clear;Diminished   LLL Breath Sounds Diminished   Secretions   Cough Non Productive   How Sputum Obtained Spontaneous   Oximetry   #Pulse Oximetry (Single Determination) Yes   Oxygen   Pulse Oximetry 95 %   O2 (LPM) 1   O2 Daily Delivery Respiratory  Silicone Nasal Cannula

## 2019-06-15 NOTE — PROGRESS NOTES
Bedside report received from Loli ETIENNE. Assumed care. POC discussed. Pt resting comfortably in bed. Safety precautions in place.

## 2019-06-15 NOTE — PROGRESS NOTES
Telemetry Shift Summary    Rhythm SR   HR Range 70-80s  Ectopy occasional PVC, rare bigem, rare trigem, rare couplet   Measurements 0.18/0.10/0.42        Normal Values  Rhythm SR  HR Range    Measurements 0.12-0.20 / 0.06-0.10  / 0.30-0.52

## 2019-06-15 NOTE — CARE PLAN
Problem: Safety  Goal: Will remain free from injury  Outcome: PROGRESSING AS EXPECTED  Patient calls appropriately, bed in low position, all needs met, no injury    Problem: Respiratory:  Goal: Respiratory status will improve  Outcome: PROGRESSING SLOWER THAN EXPECTED  MD will add steroid and continue RT,still requiring 2 l/min NC

## 2019-06-15 NOTE — PROGRESS NOTES
"Assessment completed, pt A&Ox4, no c/o pain. Pt , pt refusing insulin, reporting \"I have never taken insulin in my life,\" educated pt on importance of receiving his insulin, pt still refusing. No other needs at this time.   "

## 2019-06-15 NOTE — FLOWSHEET NOTE
06/15/19 1356   Events/Summary/Plan   Non-Invasive Resp Device Site Inspection Completed Intact   Interdisciplinary Plan of Care-Goals (Indications)   Bronchodilator Indications Physical Exam / Hyperinflation / Wheezing (bronchospasm)   Interdisciplinary Plan of Care-Outcomes    Bronchodilator Outcome Diminished Wheezing and Volume of Air Movement Increased   Education   Education Yes - Pt. / Family has been Instructed in use of Respiratory Medications and Adverse Reactions   RT Assessment of Delivered Medications   Evaluation of Medication Delivery Daily Yes-- Pt /Family has been Instructed in use of Respiratory Medications and Adverse Reactions   SVN Group   #SVN Performed Yes   Given By: Mouthpiece   Date SVN Next Change Due (Q 7 Days) 06/21/19   Incentive Spirometry Group   Breathing Exercises Yes   Incentive Spirometer Volume 2000 mL   Incentive Spirometer Next Change Date (Q 30 Days) 07/15/19   Chest Exam   Work Of Breathing / Effort Mild   Respiration 20   Pulse 80   Breath Sounds   RUL Breath Sounds Expiratory Wheezes   RML Breath Sounds Clear;Diminished   RLL Breath Sounds Fine Crackles;Diminished   KAY Breath Sounds Expiratory Wheezes   LLL Breath Sounds Fine Crackles;Diminished   Secretions   Cough Congested   How Sputum Obtained Spontaneous

## 2019-06-15 NOTE — CARE PLAN
Problem: Safety  Goal: Will remain free from injury  Outcome: PROGRESSING AS EXPECTED  Bed in low locked position, call bell within reach,  socks on    Problem: Respiratory:  Goal: Respiratory status will improve  Outcome: PROGRESSING AS EXPECTED  Monitor o2 and titrate accordingly

## 2019-06-15 NOTE — PROGRESS NOTES
Hospital Medicine Daily Progress Note    Date of Service  6/15/2019    Chief Complaint  74 y.o. male admitted 6/12/2019 with CAP    Hospital Course    see below      Interval Problem Update  CAP-improving a lot today, feels better, down to 1 L NC, remains afebrile.     A fib RVR-remains in NSR, Denies any cardiac symptoms as this time,no palpitations.     Consultants/Specialty  none    Code Status  fcfc    Disposition  TELE    Review of Systems  Review of Systems   Constitutional: Negative for chills and fever.        Almost near his baseline     Respiratory: Positive for cough, sputum production and shortness of breath.         Moderate improvement noted today   Cardiovascular: Negative for chest pain and palpitations.   Gastrointestinal: Negative for abdominal pain, nausea and vomiting.   Genitourinary: Negative for dysuria and urgency.   Neurological: Negative for weakness.   All other systems reviewed and are negative.       Physical Exam  Temp:  [36.4 °C (97.6 °F)-37.2 °C (98.9 °F)] 36.7 °C (98 °F)  Pulse:  [71-91] 77  Resp:  [18-20] 20  BP: (117-139)/() 139/78  SpO2:  [91 %-95 %] 91 %    Physical Exam   Constitutional: He is oriented to person, place, and time. He appears well-developed and well-nourished. No distress.   Speaks in full sentences   HENT:   Head: Normocephalic and atraumatic.   NC in place   Eyes: Pupils are equal, round, and reactive to light. No scleral icterus.   Neck: Normal range of motion. Neck supple.   Cardiovascular: Normal rate, regular rhythm, normal heart sounds and intact distal pulses.    No murmur heard.  Pulmonary/Chest: Effort normal. No stridor. He has no wheezes. He has rales.   Less intense rhonci in both lungs appreciated today   Abdominal: Soft. Bowel sounds are normal. He exhibits no distension.   Musculoskeletal: Normal range of motion. He exhibits no tenderness.   Neurological: He is alert and oriented to person, place, and time. Coordination normal.   Skin: Skin  is warm and dry. No rash noted.   Psychiatric: He has a normal mood and affect.   Nursing note and vitals reviewed.      Fluids    Intake/Output Summary (Last 24 hours) at 06/15/19 1150  Last data filed at 06/15/19 0900   Gross per 24 hour   Intake              720 ml   Output                0 ml   Net              720 ml       Laboratory  Recent Labs      06/13/19 0757  06/14/19   0342  06/15/19   0340   WBC  5.2  6.1  5.4   RBC  4.19*  4.51*  4.30*   HEMOGLOBIN  13.2*  13.9*  13.4*   HEMATOCRIT  39.9*  43.2  40.9*   MCV  95.2  95.8  95.1   MCH  31.5  30.8  31.2   MCHC  33.1*  32.2*  32.8*   RDW  51.2*  50.5*  49.8   PLATELETCT  125*  138*  175   MPV  9.9  10.3  10.1     Recent Labs      06/13/19 0757 06/14/19   0342  06/15/19   0340   SODIUM  134*  137  137   POTASSIUM  3.8  3.6  3.8   CHLORIDE  103  102  103   CO2  24  23  26   GLUCOSE  118*  128*  191*   BUN  13  9  11   CREATININE  1.24  1.14  1.11   CALCIUM  7.8*  8.0*  8.7             Recent Labs      06/13/19   0057   TRIGLYCERIDE  51   HDL  34*   LDL  24       Imaging  EC-ECHOCARDIOGRAM COMPLETE W/ CONT   Final Result      DX-CHEST-PORTABLE (1 VIEW)   Final Result         Patchy opacities in the right mid, right lower lung and left lower lung, concerning for multifocal pneumonia.           Assessment/Plan  * Community acquired pneumonia of right lower lobe of lung (HCC)- (present on admission)   Assessment & Plan    -multi focal on CXR and exam, quite noted clinical improvement today  CAP antibiotics ceftriaxone and azithromycin, continue day 4/5?  Lactate is within normal limits  -procal 1.92  Follow blood and sputum cultures-remains Negative thus far  -O2 supplementation, try to wean  -stop IVF's  -add prednisone 40 mg daily X5 days, day 2/5     Acute respiratory failure with hypoxia (HCC)- (present on admission)   Assessment & Plan    Secondary to pneumonia   Improving, now to 1 L NC, hopefully off soon      Atrial fibrillation with RVR (HCC)    Assessment & Plan    -paroxysmal, likely related to concurrent respiratory issues  -start dilt 30 mg Q6 hours, change to DILT  mg dialy  -ECHO is ok, remains in NSR     Neuropathy ([facility])- (present on admission)   Assessment & Plan    Cont gabapentin     Bilateral edema of lower extremity- (present on admission)   Assessment & Plan    Needs outpatient sleep study, monitor volume status closely     Obesity (BMI 30-39.9)- (present on admission)   Assessment & Plan    Patient has been counseled on diet and lifestyle modifications  Recommended outpatient weight management program and bariatric surgery evaluation  Pt educated on the increase of morbidity and mortality associated with excess weight including DM, Heart Disease, HTN, stroke, and sleep apnea.  Pt advised weight loss of 5% through reduced calorie, low carb diet and 150 mins of exercise a week       CKD (chronic kidney disease) stage 3, GFR 30-59 ml/min ([facility])- (present on admission)   Assessment & Plan    Chronic kidney disease stage 3  -stable, cr slightly improved today and near his baseline  ACEI - maintain SBP <140       Essential hypertension- (present on admission)   Assessment & Plan    Cont home meds, well controlled at this time     Mixed hyperlipidemia- (present on admission)   Assessment & Plan    -statin          VTE prophylaxis: lovenox

## 2019-06-15 NOTE — PROGRESS NOTES
Patient off oxygen satting in the low 90s. No sob. +cough. Bed in low locked position, call bell within reach. Continue to monitor.

## 2019-06-15 NOTE — PROGRESS NOTES
Bedside report given to Ludmila ETIENNE. POC discussed. Pt resting comfortably in bed. Safety precautions in place.

## 2019-06-15 NOTE — FLOWSHEET NOTE
06/14/19 1851   Interdisciplinary Plan of Care-Goals (Indications)   Bronchodilator Indications History / Diagnosis   Interdisciplinary Plan of Care-Outcomes    Bronchodilator Outcome Diminished Wheezing and Volume of Air Movement Increased   Education   Education Yes - Pt. / Family has been Instructed in use of Respiratory Equipment;Yes - Pt. / Family has been Instructed in use of Respiratory Medications and Adverse Reactions   RT Assessment of Delivered Medications   Evaluation of Medication Delivery Daily Yes-- Pt /Family has been Instructed in use of Respiratory Medications and Adverse Reactions   SVN Group   #SVN Performed Yes   Given By: Mouthpiece   Incentive Spirometry Group   Incentive Spirometry Instruction Yes   Breathing Exercises Yes   Incentive Spirometer Volume 1750 mL   Respiratory WDL   Respiratory (WDL) X   Chest Exam   Respiration 19   Pulse 91   Breath Sounds   Pre/Post Intervention Pre Intervention Assessment   RUL Breath Sounds Diminished;Expiratory Wheezes   KAY Breath Sounds Diminished;Expiratory Wheezes   Oximetry   #Pulse Oximetry (Single Determination) Yes   Oxygen   Home O2 Use Prior To Admission? No   Pulse Oximetry 91 %   O2 (LPM) 1   O2 Daily Delivery Respiratory  Silicone Nasal Cannula

## 2019-06-15 NOTE — PROGRESS NOTES
Report received from Hilda ETIENNE. Patient getting a breathing treatment. Still junky but stated he feels much better since admission. Patient educated on steroids and how it came raise blood sugar so insulin may earnestine to be given. patient verbalized understanding.

## 2019-06-15 NOTE — PROGRESS NOTES
Telemetry Strip     Strip printed: 0705  Measurements from am strip were as follows:  Rhythm:sr with bbb  HR: 74  Measurements: .20/.08/.40       Telemetry Shift Summary:    Rhythm: sr  HR: 70-80s  Ectopy:opvc           Normal Values  Rhythm SR  HR Range    Measurements 0.12-0.20 / 0.06-0.10  / 0.30-0.52

## 2019-06-15 NOTE — FLOWSHEET NOTE
06/15/19 0119   Interdisciplinary Plan of Care-Goals (Indications)   Bronchodilator Indications History / Diagnosis;Physical Exam / Hyperinflation / Wheezing (bronchospasm)   Interdisciplinary Plan of Care-Outcomes    Bronchodilator Outcome Diminished Wheezing and Volume of Air Movement Increased   Education   Education Yes - Pt. / Family has been Instructed in use of Respiratory Equipment;Yes - Pt. / Family has been Instructed in use of Respiratory Medications and Adverse Reactions   RT Assessment of Delivered Medications   Evaluation of Medication Delivery Daily Yes-- Pt /Family has been Instructed in use of Respiratory Medications and Adverse Reactions   SVN Group   #SVN Performed Yes   Given By: Mouthpiece   Respiratory WDL   Respiratory (WDL) X   Chest Exam   Respiration 20   Pulse 71   Breath Sounds   Pre/Post Intervention Pre Intervention Assessment   RUL Breath Sounds Expiratory Wheezes   RML Breath Sounds Expiratory Wheezes   RLL Breath Sounds Expiratory Wheezes   Oximetry   #Pulse Oximetry (Single Determination) Yes   Oxygen   Pulse Oximetry 94 %   O2 (LPM) 1   O2 Daily Delivery Respiratory  Silicone Nasal Cannula

## 2019-06-16 VITALS
RESPIRATION RATE: 18 BRPM | HEIGHT: 72 IN | DIASTOLIC BLOOD PRESSURE: 82 MMHG | SYSTOLIC BLOOD PRESSURE: 149 MMHG | OXYGEN SATURATION: 90 % | WEIGHT: 230.82 LBS | HEART RATE: 82 BPM | TEMPERATURE: 98.1 F | BODY MASS INDEX: 31.26 KG/M2

## 2019-06-16 LAB
BASOPHILS # BLD AUTO: 0.1 % (ref 0–1.8)
BASOPHILS # BLD: 0.01 K/UL (ref 0–0.12)
EOSINOPHIL # BLD AUTO: 0.01 K/UL (ref 0–0.51)
EOSINOPHIL NFR BLD: 0.1 % (ref 0–6.9)
ERYTHROCYTE [DISTWIDTH] IN BLOOD BY AUTOMATED COUNT: 49.7 FL (ref 35.9–50)
GLUCOSE BLD-MCNC: 114 MG/DL (ref 65–99)
GLUCOSE BLD-MCNC: 167 MG/DL (ref 65–99)
HCT VFR BLD AUTO: 40.9 % (ref 42–52)
HGB BLD-MCNC: 13.5 G/DL (ref 14–18)
IMM GRANULOCYTES # BLD AUTO: 0.06 K/UL (ref 0–0.11)
IMM GRANULOCYTES NFR BLD AUTO: 0.8 % (ref 0–0.9)
LYMPHOCYTES # BLD AUTO: 1.14 K/UL (ref 1–4.8)
LYMPHOCYTES NFR BLD: 14.8 % (ref 22–41)
MCH RBC QN AUTO: 31.1 PG (ref 27–33)
MCHC RBC AUTO-ENTMCNC: 33 G/DL (ref 33.7–35.3)
MCV RBC AUTO: 94.2 FL (ref 81.4–97.8)
MONOCYTES # BLD AUTO: 0.67 K/UL (ref 0–0.85)
MONOCYTES NFR BLD AUTO: 8.7 % (ref 0–13.4)
NEUTROPHILS # BLD AUTO: 5.81 K/UL (ref 1.82–7.42)
NEUTROPHILS NFR BLD: 75.5 % (ref 44–72)
NRBC # BLD AUTO: 0 K/UL
NRBC BLD-RTO: 0 /100 WBC
PLATELET # BLD AUTO: 199 K/UL (ref 164–446)
PMV BLD AUTO: 9.7 FL (ref 9–12.9)
RBC # BLD AUTO: 4.34 M/UL (ref 4.7–6.1)
WBC # BLD AUTO: 7.7 K/UL (ref 4.8–10.8)

## 2019-06-16 PROCEDURE — 94640 AIRWAY INHALATION TREATMENT: CPT

## 2019-06-16 PROCEDURE — 700105 HCHG RX REV CODE 258: Performed by: HOSPITALIST

## 2019-06-16 PROCEDURE — 700102 HCHG RX REV CODE 250 W/ 637 OVERRIDE(OP): Performed by: HOSPITALIST

## 2019-06-16 PROCEDURE — 85025 COMPLETE CBC W/AUTO DIFF WBC: CPT

## 2019-06-16 PROCEDURE — 99239 HOSP IP/OBS DSCHRG MGMT >30: CPT | Performed by: HOSPITALIST

## 2019-06-16 PROCEDURE — 700111 HCHG RX REV CODE 636 W/ 250 OVERRIDE (IP): Performed by: INTERNAL MEDICINE

## 2019-06-16 PROCEDURE — 700111 HCHG RX REV CODE 636 W/ 250 OVERRIDE (IP): Performed by: HOSPITALIST

## 2019-06-16 PROCEDURE — 700101 HCHG RX REV CODE 250: Performed by: INTERNAL MEDICINE

## 2019-06-16 PROCEDURE — A9270 NON-COVERED ITEM OR SERVICE: HCPCS | Performed by: HOSPITALIST

## 2019-06-16 PROCEDURE — 82962 GLUCOSE BLOOD TEST: CPT

## 2019-06-16 PROCEDURE — A9270 NON-COVERED ITEM OR SERVICE: HCPCS | Performed by: INTERNAL MEDICINE

## 2019-06-16 PROCEDURE — 700102 HCHG RX REV CODE 250 W/ 637 OVERRIDE(OP): Performed by: INTERNAL MEDICINE

## 2019-06-16 PROCEDURE — 94760 N-INVAS EAR/PLS OXIMETRY 1: CPT

## 2019-06-16 RX ORDER — CEFDINIR 300 MG/1
300 CAPSULE ORAL 2 TIMES DAILY
Qty: 6 CAP | Refills: 0 | Status: SHIPPED | OUTPATIENT
Start: 2019-06-16 | End: 2019-06-19

## 2019-06-16 RX ORDER — DILTIAZEM HYDROCHLORIDE 120 MG/1
120 CAPSULE, COATED, EXTENDED RELEASE ORAL DAILY
Qty: 30 CAP | Refills: 2 | Status: SHIPPED | OUTPATIENT
Start: 2019-06-17

## 2019-06-16 RX ORDER — DILTIAZEM HYDROCHLORIDE 120 MG/1
120 CAPSULE, COATED, EXTENDED RELEASE ORAL DAILY
Qty: 30 CAP | Refills: 2 | Status: SHIPPED | OUTPATIENT
Start: 2019-06-17 | End: 2019-06-16

## 2019-06-16 RX ORDER — PREDNISONE 20 MG/1
40 TABLET ORAL DAILY
Qty: 6 TAB | Refills: 0 | Status: SHIPPED | OUTPATIENT
Start: 2019-06-16 | End: 2019-06-19

## 2019-06-16 RX ORDER — CEFDINIR 300 MG/1
300 CAPSULE ORAL 2 TIMES DAILY
Qty: 6 CAP | Refills: 0 | Status: SHIPPED | OUTPATIENT
Start: 2019-06-16 | End: 2019-06-16

## 2019-06-16 RX ORDER — PREDNISONE 20 MG/1
40 TABLET ORAL DAILY
Qty: 6 TAB | Refills: 0 | Status: SHIPPED | OUTPATIENT
Start: 2019-06-16 | End: 2019-06-16

## 2019-06-16 RX ADMIN — DILTIAZEM HYDROCHLORIDE 10 MG: 5 INJECTION INTRAVENOUS at 02:08

## 2019-06-16 RX ADMIN — PREDNISONE 40 MG: 20 TABLET ORAL at 06:45

## 2019-06-16 RX ADMIN — CEFTRIAXONE SODIUM 2 G: 2 INJECTION, POWDER, FOR SOLUTION INTRAMUSCULAR; INTRAVENOUS at 06:47

## 2019-06-16 RX ADMIN — LEVALBUTEROL HYDROCHLORIDE 0.63 MG: 0.63 SOLUTION RESPIRATORY (INHALATION) at 01:19

## 2019-06-16 RX ADMIN — CYANOCOBALAMIN TAB 500 MCG 1000 MCG: 500 TAB at 06:44

## 2019-06-16 RX ADMIN — ENOXAPARIN SODIUM 100 MG: 100 INJECTION SUBCUTANEOUS at 00:03

## 2019-06-16 RX ADMIN — DILTIAZEM HYDROCHLORIDE 120 MG: 120 CAPSULE, COATED, EXTENDED RELEASE ORAL at 06:45

## 2019-06-16 RX ADMIN — ASPIRIN 81 MG: 81 TABLET, COATED ORAL at 06:44

## 2019-06-16 RX ADMIN — LEVALBUTEROL HYDROCHLORIDE 0.63 MG: 0.63 SOLUTION RESPIRATORY (INHALATION) at 06:44

## 2019-06-16 RX ADMIN — VITAMIN D, TAB 1000IU (100/BT) 1000 UNITS: 25 TAB at 06:44

## 2019-06-16 RX ADMIN — GABAPENTIN 300 MG: 300 CAPSULE ORAL at 00:03

## 2019-06-16 RX ADMIN — GUAIFENESIN 600 MG: 600 TABLET, EXTENDED RELEASE ORAL at 06:44

## 2019-06-16 RX ADMIN — GABAPENTIN 300 MG: 300 CAPSULE ORAL at 09:13

## 2019-06-16 NOTE — FLOWSHEET NOTE
06/16/19 0119   Interdisciplinary Plan of Care-Goals (Indications)   Bronchodilator Indications History / Diagnosis   Interdisciplinary Plan of Care-Outcomes    Bronchodilator Outcome Diminished Wheezing and Volume of Air Movement Increased   Education   Education Yes - Pt. / Family has been Instructed in use of Respiratory Equipment;Yes - Pt. / Family has been Instructed in use of Respiratory Medications and Adverse Reactions   RT Assessment of Delivered Medications   Evaluation of Medication Delivery Daily Yes-- Pt /Family has been Instructed in use of Respiratory Medications and Adverse Reactions   SVN Group   #SVN Performed Yes   Given By: Mouthpiece   Respiratory WDL   Respiratory (WDL) X   Chest Exam   Respiration 18   Pulse 70   Breath Sounds   Pre/Post Intervention Pre Intervention Assessment   RUL Breath Sounds Diminished;Expiratory Wheezes   KAY Breath Sounds Diminished;Expiratory Wheezes  (Wheezy cough)   Oximetry   #Pulse Oximetry (Single Determination) Yes   Oxygen   Pulse Oximetry 90 %   O2 (LPM) 0   O2 Daily Delivery Respiratory  Room Air with O2 Available

## 2019-06-16 NOTE — PROGRESS NOTES
Telemetry Shift Summary    Rhythm SR 70s-80s; Afib w/ RVR up to 170s at 6255-4295; back to SR 70s down to 43; 4 beats of ventricular tachycardia x 2  HR Range See above  Ectopy fPVC, rCoup, rBi  Measurements 0.18/0.08/0.40        Normal Values  Rhythm SR  HR Range    Measurements 0.12-0.20 / 0.06-0.10  / 0.30-0.52

## 2019-06-16 NOTE — PROGRESS NOTES
Received bedside report from LOWELL Keys. Plan of care discussed. Safety precautions in place. Call light and personal belongings within reach. Patient has no needs at this time.

## 2019-06-16 NOTE — PROGRESS NOTES
Telemetry Strip     Strip printed: 0645  Measurements from am strip were as follows:  Rhythm:sr with bbb  HR: 77  Measurements: .14/.10/.36       Telemetry Shift Summary:    Rhythm: sr wit bbb  HR: 70-80s  Ectopy: rpvc           Normal Values  Rhythm SR  HR Range    Measurements 0.12-0.20 / 0.06-0.10  / 0.30-0.52

## 2019-06-16 NOTE — FLOWSHEET NOTE
06/15/19 1850   Interdisciplinary Plan of Care-Outcomes    Bronchodilator Outcome Diminished Wheezing and Volume of Air Movement Increased   Education   Education Yes - Pt. / Family has been Instructed in use of Respiratory Equipment;Yes - Pt. / Family has been Instructed in use of Respiratory Medications and Adverse Reactions   RT Assessment of Delivered Medications   Evaluation of Medication Delivery Daily Yes-- Pt /Family has been Instructed in use of Respiratory Medications and Adverse Reactions   SVN Group   #SVN Performed Yes   Given By: Mouthpiece   Incentive Spirometry Group   Incentive Spirometry Instruction Yes   Breathing Exercises Yes   Incentive Spirometer Volume 2000 mL   Respiratory WDL   Respiratory (WDL) X   Chest Exam   Respiration 18   Pulse 70   Breath Sounds   Pre/Post Intervention Pre Intervention Assessment   RUL Breath Sounds Diminished;Expiratory Wheezes   KAY Breath Sounds Diminished;Expiratory Wheezes   Oximetry   #Pulse Oximetry (Single Determination) Yes   Oxygen   Home O2 Use Prior To Admission? No   Pulse Oximetry 93 %   O2 (LPM) 0   O2 Daily Delivery Respiratory  Room Air with O2 Available

## 2019-06-16 NOTE — DISCHARGE SUMMARY
Discharge Summary    CHIEF COMPLAINT ON ADMISSION  No chief complaint on file.      Reason for Admission  Pneumonia     Admission Date  6/12/2019    CODE STATUS  Prior    HPI & HOSPITAL COURSE  This is a 74 y.o. male here with fevers, body aches and weakness. He was admitted with evidence for pneumonia. He was treated with IV antibiotics, fluid and electrolyte correction. His admission was complicated by atrial fibrillation with RVR. He was treated with diltiazem and xarelto. He is now rate controlled and intermittently in NSR. His echocardiogram is detailed below. He will complete a course of oral antibiotics at home and follow up with his pcp. He may need a cardiology referral depending on his PCP's comfort level in managing his new afib. His cultures remained negative during this admission.        Therefore, he is discharged in good and stable condition to home with close outpatient follow-up.    The patient met 2-midnight criteria for an inpatient stay at the time of discharge.    Discharge Date  6/16/2019    FOLLOW UP ITEMS POST DISCHARGE  none    DISCHARGE DIAGNOSES  Principal Problem:    Community acquired pneumonia of right lower lobe of lung (HCC) POA: Yes      Overview:         Active Problems:    Acute respiratory failure with hypoxia (HCC) POA: Yes    Mixed hyperlipidemia POA: Yes    Essential hypertension POA: Yes    CKD (chronic kidney disease) stage 3, GFR 30-59 ml/min (HCC) POA: Yes    Obesity (BMI 30-39.9) POA: Yes    Bilateral edema of lower extremity POA: Yes    Neuropathy (HCC) POA: Yes    Atrial fibrillation with RVR (HCC) POA: No  Resolved Problems:    * No resolved hospital problems. *      FOLLOW UP  Future Appointments  Date Time Provider Department Center   6/20/2019 7:20 AM Juliocesar Piedra M.D. [id number] [id number]  [phone]            MEDICATIONS ON DISCHARGE     Medication List      START taking these medications       Instructions   cefdinir 300 MG Caps  Commonly known as:  OMNICEF   Take 1 Cap by mouth 2 times a day for 3 days.  Dose:  300 mg     DILTIAZem  MG Cp24  Commonly known as:  CARDIZEM CD   Take 1 Cap by mouth every day.  Dose:  120 mg     predniSONE 20 MG Tabs  Commonly known as:  DELTASONE   Take 2 Tabs by mouth every day for 3 days.  Dose:  40 mg     rivaroxaban 20 MG Tabs tablet  Commonly known as:  XARELTO   Take 1 Tab by mouth with dinner.  Dose:  20 mg        CONTINUE taking these medications      Instructions   atorvastatin 80 MG tablet  Commonly known as:  LIPITOR   Take 80 mg by mouth every evening.  Dose:  80 mg     gabapentin 300 MG Caps  Commonly known as:  NEURONTIN   Take 300 mg by mouth 4 times a day.  Dose:  300 mg     lisinopril 20 MG Tabs  Commonly known as:  PRINIVIL   Take 20 mg by mouth every day.  Dose:  20 mg        STOP taking these medications    amLODIPine 2.5 MG Tabs  Commonly known as:  NORVASC            Allergies  No Known Allergies    DIET  No orders of the defined types were placed in this encounter.      ACTIVITY  As tolerated.  Weight bearing as tolerated    CONSULTATIONS  none    PROCEDURES  None    Echocardiography Laboratory    CONCLUSIONS  TDS - Body Habitus  No prior study is available for comparison.   Left ventricular ejection fraction is visually estimated to be 60%.  The right ventricle was normal in size and function.  Dilated inferior vena cava without inspiratory collapse.  No significant valve disease or flow abnormalities.       LABORATORY  Lab Results   Component Value Date    SODIUM 137 06/15/2019    POTASSIUM 3.8 06/15/2019    CHLORIDE 103 06/15/2019    CO2 26 06/15/2019    GLUCOSE 191 (H) 06/15/2019    BUN 11 06/15/2019    CREATININE 1.11 06/15/2019        Lab Results   Component Value Date    WBC 7.7 06/16/2019    HEMOGLOBIN 13.5 (L) 06/16/2019    HEMATOCRIT 40.9 (L) 06/16/2019    PLATELETCT 199 06/16/2019        Total time of the discharge process exceeds 37  minutes.

## 2019-06-16 NOTE — PROGRESS NOTES
Patient converted to a-fib on monitor with HR up to the 170s at 01:51; sustaining HR 140s-160s.     Primary RN, Anurag notified.     PRN Cardizem administered; see MAR

## 2019-06-16 NOTE — DISCHARGE INSTRUCTIONS
Discharge Instructions    Discharged to home by car with relative. Discharged via walking, hospital escort: Refused.  Special equipment needed: Not Applicable    Be sure to schedule a follow-up appointment with your primary care doctor or any specialists as instructed.     Discharge Plan:   Influenza Vaccine Indication: Not indicated: Previously immunized this influenza season and > 8 years of age    I understand that a diet low in cholesterol, fat, and sodium is recommended for good health. Unless I have been given specific instructions below for another diet, I accept this instruction as my diet prescription.   Other diet: cardiac, ada    Special Instructions: None    · Is patient discharged on Warfarin / Coumadin?   No     Depression / Suicide Risk    As you are discharged from this [facility] facility, it is important to learn how to keep safe from harming yourself.    Recognize the warning signs:  · Abrupt changes in personality, positive or negative- including increase in energy   · Giving away possessions  · Change in eating patterns- significant weight changes-  positive or negative  · Change in sleeping patterns- unable to sleep or sleeping all the time   · Unwillingness or inability to communicate  · Depression  · Unusual sadness, discouragement and loneliness  · Talk of wanting to die  · Neglect of personal appearance   · Rebelliousness- reckless behavior  · Withdrawal from people/activities they love  · Confusion- inability to concentrate     If you or a loved one observes any of these behaviors or has concerns about self-harm, here's what you can do:  · Talk about it- your feelings and reasons for harming yourself  · Remove any means that you might use to hurt yourself (examples: pills, rope, extension cords, firearm)  · Get professional help from the community (Mental Health, Substance Abuse, psychological counseling)  · Do not be alone:Call your Safe Contact- someone whom you trust who will be  there for you.  · Call your local CRISIS HOTLINE 302-5468 or 959-049-1858  · Call your local Children's Mobile Crisis Response Team [place] (127) 427-3671 or www.[employer]  · Call the toll free National Suicide Prevention Hotlines   · National Suicide Prevention Lifeline 827-537-BDVI (0393)  · National Hope Line Network 800-SUICIDE (157-2326)    Rivaroxaban oral tablets  What is this medicine?  RIVAROXABAN (ri va AUBREY a ban) is an anticoagulant (blood thinner). It is used to treat blood clots in the lungs or in the veins. It is also used after knee or hip surgeries to prevent blood clots. It is also used to lower the chance of stroke in people with a medical condition called atrial fibrillation.  This medicine may be used for other purposes; ask your health care provider or pharmacist if you have questions.  COMMON BRAND NAME(S): Xarelto, Xarelto Starter Pack  What should I tell my health care provider before I take this medicine?  They need to know if you have any of these conditions:  -bleeding disorders  -bleeding in the brain  -blood in your stools (black or tarry stools) or if you have blood in your vomit  -history of stomach bleeding  -kidney disease  -liver disease  -low blood counts, like low white cell, platelet, or red cell counts  -recent or planned spinal or epidural procedure  -take medicines that treat or prevent blood clots  -an unusual or allergic reaction to rivaroxaban, other medicines, foods, dyes, or preservatives  -pregnant or trying to get pregnant  -breast-feeding  How should I use this medicine?  Take this medicine by mouth with a glass of water. Follow the directions on the prescription label. Take your medicine at regular intervals. Do not take it more often than directed. Do not stop taking except on your doctor's advice. Stopping this medicine may increase your risk of a blood clot. Be sure to refill your prescription before you run out of medicine.  If you are taking this  medicine after hip or knee replacement surgery, take it with or without food. If you are taking this medicine for atrial fibrillation, take it with your evening meal. If you are taking this medicine to treat blood clots, take it with food at the same time each day. If you are unable to swallow your tablet, you may crush the tablet and mix it in applesauce. Then, immediately eat the applesauce. You should eat more food right after you eat the applesauce containing the crushed tablet.  Talk to your pediatrician regarding the use of this medicine in children. Special care may be needed.  Overdosage: If you think you have taken too much of this medicine contact a poison control center or emergency room at once.  NOTE: This medicine is only for you. Do not share this medicine with others.  What if I miss a dose?  If you take your medicine once a day and miss a dose, take the missed dose as soon as you remember. If you take your medicine twice a day and miss a dose, take the missed dose immediately. In this instance, 2 tablets may be taken at the same time. The next day you should take 1 tablet twice a day as directed.  What may interact with this medicine?  Do not take this medicine with any of the following medications:  -defibrotide  This medicine may also interact with the following medications:  -aspirin and aspirin-like medicines  -certain antibiotics like erythromycin, azithromycin, and clarithromycin  -certain medicines for fungal infections like ketoconazole and itraconazole  -certain medicines for irregular heart beat like amiodarone, quinidine, dronedarone  -certain medicines for seizures like carbamazepine, phenytoin  -certain medicines that treat or prevent blood clots like warfarin, enoxaparin, and dalteparin  -conivaptan  -diltiazem  -felodipine  -indinavir  -lopinavir; ritonavir  -NSAIDS, medicines for pain and inflammation, like ibuprofen or naproxen  -ranolazine  -rifampin  -ritonavir  -SNRIs, medicines  for depression, like desvenlafaxine, duloxetine, levomilnacipran, venlafaxine  -SSRIs, medicines for depression, like citalopram, escitalopram, fluoxetine, fluvoxamine, paroxetine, sertraline  -[place]'s wort  -verapamil  This list may not describe all possible interactions. Give your health care provider a list of all the medicines, herbs, non-prescription drugs, or dietary supplements you use. Also tell them if you smoke, drink alcohol, or use illegal drugs. Some items may interact with your medicine.  What should I watch for while using this medicine?  Visit your doctor or health care professional for regular checks on your progress.  Notify your doctor or health care professional and seek emergency treatment if you develop breathing problems; changes in vision; chest pain; severe, sudden headache; pain, swelling, warmth in the leg; trouble speaking; sudden numbness or weakness of the face, arm or leg. These can be signs that your condition has gotten worse.  If you are going to have surgery or other procedure, tell your doctor that you are taking this medicine.  What side effects may I notice from receiving this medicine?  Side effects that you should report to your doctor or health care professional as soon as possible:  -allergic reactions like skin rash, itching or hives, swelling of the face, lips, or tongue  -back pain  -redness, blistering, peeling or loosening of the skin, including inside the mouth  -signs and symptoms of bleeding such as bloody or black, tarry stools; red or dark-brown urine; spitting up blood or brown material that looks like coffee grounds; red spots on the skin; unusual bruising or bleeding from the eye, gums, or nose  Side effects that usually do not require medical attention (report to your doctor or health care professional if they continue or are bothersome):  -dizziness  -muscle pain  This list may not describe all possible side effects. Call your doctor for medical advice  about side effects. You may report side effects to FDA at 2-775-IPS-4693.  Where should I keep my medicine?  Keep out of the reach of children.  Store at room temperature between 15 and 30 degrees C (59 and 86 degrees F). Throw away any unused medicine after the expiration date.  NOTE: This sheet is a summary. It may not cover all possible information. If you have questions about this medicine, talk to your doctor, pharmacist, or health care provider.  © 2018 [employer]vier/Gold Standard (2017-09-06 16:29:33)

## 2019-06-16 NOTE — CARE PLAN
Problem: Safety  Goal: Will remain free from injury  Outcome: PROGRESSING AS EXPECTED  Remind patient to use call light and provide assistance. Bed in low position, bed locked, and appropriate alarms set. Patient wearing non-slip socks. Call light and personal belongings are within reach.    Problem: Infection  Goal: Will remain free from infection  Outcome: PROGRESSING AS EXPECTED  Assess and monitor for signs and symptoms of infection. Perform hand hygiene before and after patient contact and entering/exiting the room. Educate patient and family on infection control and hand hygiene.

## 2019-06-16 NOTE — PROGRESS NOTES
Report received from Anurag ETIENNE. Patient resting in bed getting neb treatment. Did have some Afib overnight for 40 min, back to SR. Patient eager to go home. No complaints of CP. Bed in low locked position, call bell within reach. Continue to monitor.

## 2019-06-16 NOTE — FLOWSHEET NOTE
06/16/19 0645   Interdisciplinary Plan of Care-Goals (Indications)   Bronchodilator Indications History / Diagnosis   Interdisciplinary Plan of Care-Outcomes    Bronchodilator Outcome Diminished Wheezing and Volume of Air Movement Increased   Education   Education Yes - Pt. / Family has been Instructed in use of Respiratory Medications and Adverse Reactions   RT Assessment of Delivered Medications   Evaluation of Medication Delivery Daily Yes-- Pt /Family has been Instructed in use of Respiratory Medications and Adverse Reactions   SVN Group   #SVN Performed Yes   Given By: Mouthpiece   Respiratory WDL   Respiratory (WDL) X   Chest Exam   Work Of Breathing / Effort Mild   Respiration 17   Pulse 81  (Simultaneous filing. User may not have seen previous data.)   Breath Sounds   Pre/Post Intervention Pre Intervention Assessment   RUL Breath Sounds Clear   RML Breath Sounds Clear   RLL Breath Sounds Diminished   KAY Breath Sounds Rhonchi   LLL Breath Sounds Diminished   Secretions   Cough Congested   Oximetry   #Pulse Oximetry (Single Determination) Yes   Oxygen   Pulse Oximetry 91 %   O2 (LPM) 0   O2 Daily Delivery Respiratory  Room Air with O2 Available

## 2019-06-16 NOTE — DISCHARGE PLANNING
Anticipated Discharge Disposition: Home    Action: Called [facility] pharmacy to verify xarelto co pay amount. Pharmacy states pt doesn't have active part D insurance and cost would be $544.   [facility]    Barriers to Discharge: None    Plan: D/c home when cleared

## 2019-06-16 NOTE — PROGRESS NOTES
Patient given discharge information, verbalized understanding. IV and tele discontinued. Patient taken to front lobby via wheelchair accompanied by .

## 2019-06-17 ENCOUNTER — PATIENT OUTREACH (OUTPATIENT)
Dept: HEALTH INFORMATION MANAGEMENT | Facility: OTHER | Age: 74
End: 2019-06-17

## 2019-06-18 ENCOUNTER — PATIENT OUTREACH (OUTPATIENT)
Dept: HEALTH INFORMATION MANAGEMENT | Facility: OTHER | Age: 74
End: 2019-06-18

## 2019-06-18 LAB
BACTERIA BLD CULT: NORMAL
BACTERIA BLD CULT: NORMAL
SIGNIFICANT IND 70042: NORMAL
SIGNIFICANT IND 70042: NORMAL
SITE SITE: NORMAL
SITE SITE: NORMAL
SOURCE SOURCE: NORMAL
SOURCE SOURCE: NORMAL

## 2019-07-17 ENCOUNTER — PATIENT OUTREACH (OUTPATIENT)
Dept: HEALTH INFORMATION MANAGEMENT | Facility: OTHER | Age: 74
End: 2019-07-17

## 2019-07-18 NOTE — PROGRESS NOTES
Outcome: Left voicemail/ message    Please transfer to [facility]  [phone] when patient returns call.     [facility] Verified: yes     Attempt # 2

## 2019-08-13 NOTE — PROGRESS NOTES
Outcome: Left voicemail/ message    Please transfer to [facility]  [phone] when patient returns call.     [facility] Verified: yes     Attempt # 3

## 2019-10-25 NOTE — PROGRESS NOTES
.Outcome: Left voicemail/ message    Please transfer to [facility]  [phone] when patient returns call.     [facility] Verified: yes     Attempt # 3

## 2020-02-28 ENCOUNTER — PATIENT OUTREACH (OUTPATIENT)
Dept: HEALTH INFORMATION MANAGEMENT | Facility: OTHER | Age: 75
End: 2020-02-28

## 2020-02-28 NOTE — PROGRESS NOTES
Outcome: Left voicemail/ message    Please transfer to [facility]  [phone] when patient returns call.     [facility] Verified: yes     Attempt # 1

## 2020-03-12 NOTE — PROGRESS NOTES
1. [facility] Verified: yes    2. Verify PCP: yes    3. Review and add  to Care Team: yes    5. Reviewed/Updated the following with patient:       •   Communication Preference Obtained? YES  • MyChart Activation: already active       •   E-Mail Address Obtained? YES       •   Appointment Day and Time Preferences? YES       •   [facility]       •   [facility]? [facility]- [facility]    6. Care Gap Scheduling (Attempt to Schedule EACH Overdue Care Gap!)

## 2021-01-11 DIAGNOSIS — Z23 NEED FOR VACCINATION: ICD-10-CM

## 2021-04-27 ENCOUNTER — PATIENT MESSAGE (OUTPATIENT)
Dept: HEALTH INFORMATION MANAGEMENT | Facility: OTHER | Age: 76
End: 2021-04-27

## 2021-06-04 ENCOUNTER — PATIENT OUTREACH (OUTPATIENT)
Dept: HEALTH INFORMATION MANAGEMENT | Facility: OTHER | Age: 76
End: 2021-06-04

## 2021-06-04 NOTE — PROGRESS NOTES
Outcome:   [facility] and  not [facility] on the  Comprehensive Geriatric Assessment    Please transfer to Patient Outreach Team at [phone] when patient returns call.        Attempt # 2

## 2021-07-16 ENCOUNTER — TELEPHONE (OUTPATIENT)
Dept: ENDOCRINOLOGY | Facility: MEDICAL CENTER | Age: 76
End: 2021-07-16

## 2021-07-16 NOTE — TELEPHONE ENCOUNTER
VOICEMAIL  1. Caller Name: Corbin Rodríguez                        Call Back Number: [phone]      2. Message: Patient called because he was referred to the HIP program and wanted to see if we had the referral.    3. Patient approves office to leave a detailed voicemail/MyChart message: yes    I contacted patient to let him know we received his message and to let him know the office is closed. I redirected him to his pcp so he could be referred somewhere else. He thanked me for the call and will contact pcp.

## 2021-10-08 NOTE — PROGRESS NOTES
Dr. Varghese notified that patient had confirmed Afib on EKG. Rate has gone as high as 180s but is 140s-150s sustained. Dr. Varghese entering orders.    Quality 226: Preventive Care And Screening: Tobacco Use: Screening And Cessation Intervention: Patient screened for tobacco use and is an ex/non-smoker Quality 130: Documentation Of Current Medications In The Medical Record: Current Medications Documented Quality 431: Preventive Care And Screening: Unhealthy Alcohol Use - Screening: Patient identified as an unhealthy alcohol user when screened for unhealthy alcohol use using a systematic screening method and received brief counseling Detail Level: Detailed

## 2021-11-01 ENCOUNTER — PATIENT MESSAGE (OUTPATIENT)
Dept: HEALTH INFORMATION MANAGEMENT | Facility: OTHER | Age: 76
End: 2021-11-01

## 2022-06-22 ENCOUNTER — TELEPHONE (OUTPATIENT)
Dept: HEALTH INFORMATION MANAGEMENT | Facility: OTHER | Age: 77
End: 2022-06-22

## 2022-11-04 ENCOUNTER — DOCUMENTATION (OUTPATIENT)
Dept: HEALTH INFORMATION MANAGEMENT | Facility: OTHER | Age: 77
End: 2022-11-04
Payer: MEDICARE

## 2023-07-12 ENCOUNTER — TELEPHONE (OUTPATIENT)
Dept: HEALTH INFORMATION MANAGEMENT | Facility: OTHER | Age: 78
End: 2023-07-12
Payer: MEDICARE

## 2023-11-13 NOTE — CARE PLAN
Problem: Safety  Goal: Will remain free from injury  Outcome: PROGRESSING AS EXPECTED  Pt call light and belongings with in reach, bed in locked and low position, treaded socks on, bed rails up x2      Problem: Pain Management  Goal: Pain level will decrease to patient's comfort goal  Outcome: PROGRESSING AS EXPECTED  Pt has no c/o pain throughout shift. Will continue to monitor       See other note.    Brian Wiseman MD

## 2024-07-30 NOTE — PROGRESS NOTES
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 IHD visited the patient bedside. The patient was discharged home. The patient's medical conditions included: Pneumonia and Cellulitis, A-fib.  The patient was not under clinical case management.    The Patient was discharged with the following medications: Rivaroxaban  Prednisone , DILTIAZEM HYDROCHLORIDE , and Cefdinir . The patient successfully filled all medications.     The patient was ordered to follow-up with PCP. The patient had the following appointments:     1) 6/20/2019 @ 7:20 Juliocesar Piedra, internal medicine - Physician office cancelled     2) 6/26/2019 @ 12:00 Cammy Chan, general/family practice- IHD was unable to confirm appointment due to [facility].     The patient has no future appointments scheduled.     IHD followed the patient for a total of 30 days and Patient became non-responsive to IHD outreaches. Patient was discharge with a low lace score, therefore, a PPS screening was not conducted for the patient.  
pt uncooperative

## 2024-09-04 ENCOUNTER — PATIENT MESSAGE (OUTPATIENT)
Dept: HEALTH INFORMATION MANAGEMENT | Facility: OTHER | Age: 79
End: 2024-09-04

## 2024-12-20 ENCOUNTER — TELEPHONE (OUTPATIENT)
Dept: HEALTH INFORMATION MANAGEMENT | Facility: OTHER | Age: 79
End: 2024-12-20
Payer: MEDICARE

## 2025-06-05 NOTE — PROGRESS NOTES
Patient [facility] on need [facility], patient refused to have IV replaced, states he is a hard stick and the lab was very rough last night and he is not willing to have any more    Quality 130: Documentation Of Current Medications In The Medical Record: Current Medications Documented Quality 226: Preventive Care And Screening: Tobacco Use: Screening And Cessation Intervention: Patient screened for tobacco use and is an ex/non-smoker Detail Level: Detailed